# Patient Record
Sex: MALE | Race: WHITE | NOT HISPANIC OR LATINO | Employment: UNEMPLOYED | URBAN - METROPOLITAN AREA
[De-identification: names, ages, dates, MRNs, and addresses within clinical notes are randomized per-mention and may not be internally consistent; named-entity substitution may affect disease eponyms.]

---

## 2020-01-29 ENCOUNTER — APPOINTMENT (OUTPATIENT)
Dept: GENERAL RADIOLOGY | Facility: HOSPITAL | Age: 25
End: 2020-01-29

## 2020-01-29 ENCOUNTER — HOSPITAL ENCOUNTER (EMERGENCY)
Facility: HOSPITAL | Age: 25
Discharge: HOME | End: 2020-01-29
Admitting: EMERGENCY MEDICINE

## 2020-01-29 VITALS
TEMPERATURE: 97.9 F | OXYGEN SATURATION: 98 % | HEART RATE: 66 BPM | BODY MASS INDEX: 24.97 KG/M2 | HEIGHT: 71 IN | DIASTOLIC BLOOD PRESSURE: 77 MMHG | SYSTOLIC BLOOD PRESSURE: 129 MMHG | RESPIRATION RATE: 18 BRPM | WEIGHT: 178.35 LBS

## 2020-01-29 DIAGNOSIS — S62.356A CLOSED NONDISPLACED FRACTURE OF SHAFT OF FIFTH METACARPAL BONE OF RIGHT HAND, INITIAL ENCOUNTER: Primary | ICD-10-CM

## 2020-01-29 DIAGNOSIS — M79.641 RIGHT HAND PAIN: ICD-10-CM

## 2020-01-29 PROCEDURE — 73130 X-RAY EXAM OF HAND: CPT

## 2020-01-29 PROCEDURE — 99283 EMERGENCY DEPT VISIT LOW MDM: CPT

## 2020-01-29 RX ORDER — IBUPROFEN 600 MG/1
600 TABLET ORAL ONCE
Status: COMPLETED | OUTPATIENT
Start: 2020-01-29 | End: 2020-01-29

## 2020-01-29 RX ADMIN — IBUPROFEN 600 MG: 600 TABLET, FILM COATED ORAL at 14:56

## 2020-02-01 ENCOUNTER — HOSPITAL ENCOUNTER (OUTPATIENT)
Facility: HOSPITAL | Age: 25
Setting detail: HOSPITAL OUTPATIENT SURGERY
Discharge: HOME | End: 2020-02-01
Attending: ORTHOPAEDIC SURGERY | Admitting: ORTHOPAEDIC SURGERY

## 2020-02-01 ENCOUNTER — ANESTHESIA EVENT (OUTPATIENT)
Dept: PERIOP | Facility: HOSPITAL | Age: 25
End: 2020-02-01

## 2020-02-01 ENCOUNTER — HOSPITAL ENCOUNTER (OUTPATIENT)
Dept: GENERAL RADIOLOGY | Facility: HOSPITAL | Age: 25
Discharge: HOME | End: 2020-02-01

## 2020-02-01 ENCOUNTER — HOSPITAL ENCOUNTER (OUTPATIENT)
Dept: GENERAL RADIOLOGY | Facility: HOSPITAL | Age: 25
Discharge: HOME | End: 2020-02-01
Admitting: ORTHOPAEDIC SURGERY

## 2020-02-01 ENCOUNTER — ANESTHESIA (OUTPATIENT)
Dept: PERIOP | Facility: HOSPITAL | Age: 25
End: 2020-02-01

## 2020-02-01 VITALS
WEIGHT: 181.22 LBS | BODY MASS INDEX: 25.37 KG/M2 | HEART RATE: 68 BPM | OXYGEN SATURATION: 98 % | DIASTOLIC BLOOD PRESSURE: 68 MMHG | HEIGHT: 71 IN | TEMPERATURE: 97.3 F | SYSTOLIC BLOOD PRESSURE: 125 MMHG | RESPIRATION RATE: 16 BRPM

## 2020-02-01 DIAGNOSIS — T14.8XXA FRACTURE: ICD-10-CM

## 2020-02-01 DIAGNOSIS — S62.326A CLOSED DISPLACED FRACTURE OF SHAFT OF FIFTH METACARPAL BONE OF RIGHT HAND, INITIAL ENCOUNTER: Primary | ICD-10-CM

## 2020-02-01 PROCEDURE — 25010000002 ONDANSETRON PER 1 MG: Performed by: ANESTHESIOLOGY

## 2020-02-01 PROCEDURE — 73120 X-RAY EXAM OF HAND: CPT

## 2020-02-01 PROCEDURE — 25010000002 DEXAMETHASONE PER 1 MG: Performed by: ANESTHESIOLOGY

## 2020-02-01 PROCEDURE — 25010000002 FENTANYL CITRATE (PF) 100 MCG/2ML SOLUTION: Performed by: ANESTHESIOLOGY

## 2020-02-01 PROCEDURE — C1713 ANCHOR/SCREW BN/BN,TIS/BN: HCPCS | Performed by: ORTHOPAEDIC SURGERY

## 2020-02-01 PROCEDURE — 25010000002 MIDAZOLAM PER 1 MG: Performed by: ANESTHESIOLOGY

## 2020-02-01 PROCEDURE — 25010000002 PROPOFOL 10 MG/ML EMULSION: Performed by: ANESTHESIOLOGY

## 2020-02-01 PROCEDURE — 76000 FLUOROSCOPY <1 HR PHYS/QHP: CPT

## 2020-02-01 PROCEDURE — 25010000002 KETOROLAC TROMETHAMINE PER 15 MG: Performed by: ANESTHESIOLOGY

## 2020-02-01 DEVICE — IMPLANTABLE DEVICE
Type: IMPLANTABLE DEVICE | Site: FINGER LITTLE | Status: FUNCTIONAL
Brand: MICROAIRE®

## 2020-02-01 RX ORDER — ONDANSETRON 2 MG/ML
INJECTION INTRAMUSCULAR; INTRAVENOUS AS NEEDED
Status: DISCONTINUED | OUTPATIENT
Start: 2020-02-01 | End: 2020-02-01 | Stop reason: SURG

## 2020-02-01 RX ORDER — NALBUPHINE HCL 10 MG/ML
2 AMPUL (ML) INJECTION EVERY 4 HOURS PRN
Status: DISCONTINUED | OUTPATIENT
Start: 2020-02-01 | End: 2020-02-01 | Stop reason: HOSPADM

## 2020-02-01 RX ORDER — PROPOFOL 10 MG/ML
VIAL (ML) INTRAVENOUS AS NEEDED
Status: DISCONTINUED | OUTPATIENT
Start: 2020-02-01 | End: 2020-02-01 | Stop reason: SURG

## 2020-02-01 RX ORDER — BUPIVACAINE HYDROCHLORIDE 2.5 MG/ML
INJECTION, SOLUTION INFILTRATION; PERINEURAL AS NEEDED
Status: DISCONTINUED | OUTPATIENT
Start: 2020-02-01 | End: 2020-02-01 | Stop reason: HOSPADM

## 2020-02-01 RX ORDER — FLUMAZENIL 0.1 MG/ML
0.2 INJECTION INTRAVENOUS AS NEEDED
Status: DISCONTINUED | OUTPATIENT
Start: 2020-02-01 | End: 2020-02-01 | Stop reason: HOSPADM

## 2020-02-01 RX ORDER — MIDAZOLAM HYDROCHLORIDE 1 MG/ML
1 INJECTION INTRAMUSCULAR; INTRAVENOUS
Status: DISCONTINUED | OUTPATIENT
Start: 2020-02-01 | End: 2020-02-01 | Stop reason: HOSPADM

## 2020-02-01 RX ORDER — SODIUM CHLORIDE 9 MG/ML
INJECTION, SOLUTION INTRAVENOUS CONTINUOUS PRN
Status: DISCONTINUED | OUTPATIENT
Start: 2020-02-01 | End: 2020-02-01 | Stop reason: SURG

## 2020-02-01 RX ORDER — HYDROMORPHONE HCL 110MG/55ML
0.5 PATIENT CONTROLLED ANALGESIA SYRINGE INTRAVENOUS
Status: DISCONTINUED | OUTPATIENT
Start: 2020-02-01 | End: 2020-02-01 | Stop reason: HOSPADM

## 2020-02-01 RX ORDER — MEPERIDINE HYDROCHLORIDE 25 MG/ML
12.5 INJECTION INTRAMUSCULAR; INTRAVENOUS; SUBCUTANEOUS
Status: DISCONTINUED | OUTPATIENT
Start: 2020-02-01 | End: 2020-02-01 | Stop reason: HOSPADM

## 2020-02-01 RX ORDER — LABETALOL HYDROCHLORIDE 5 MG/ML
5 INJECTION, SOLUTION INTRAVENOUS
Status: DISCONTINUED | OUTPATIENT
Start: 2020-02-01 | End: 2020-02-01 | Stop reason: HOSPADM

## 2020-02-01 RX ORDER — SODIUM CHLORIDE 0.9 % (FLUSH) 0.9 %
10 SYRINGE (ML) INJECTION AS NEEDED
Status: DISCONTINUED | OUTPATIENT
Start: 2020-02-01 | End: 2020-02-01 | Stop reason: HOSPADM

## 2020-02-01 RX ORDER — ALBUTEROL SULFATE 2.5 MG/3ML
2.5 SOLUTION RESPIRATORY (INHALATION) ONCE AS NEEDED
Status: DISCONTINUED | OUTPATIENT
Start: 2020-02-01 | End: 2020-02-01 | Stop reason: HOSPADM

## 2020-02-01 RX ORDER — HYDROCODONE BITARTRATE AND ACETAMINOPHEN 7.5; 325 MG/1; MG/1
1-2 TABLET ORAL EVERY 4 HOURS PRN
Qty: 20 TABLET | Refills: 0 | Status: SHIPPED | OUTPATIENT
Start: 2020-02-01 | End: 2021-07-02

## 2020-02-01 RX ORDER — KETOROLAC TROMETHAMINE 30 MG/ML
INJECTION, SOLUTION INTRAMUSCULAR; INTRAVENOUS AS NEEDED
Status: DISCONTINUED | OUTPATIENT
Start: 2020-02-01 | End: 2020-02-01 | Stop reason: SURG

## 2020-02-01 RX ORDER — DIPHENHYDRAMINE HYDROCHLORIDE 50 MG/ML
12.5 INJECTION INTRAMUSCULAR; INTRAVENOUS
Status: DISCONTINUED | OUTPATIENT
Start: 2020-02-01 | End: 2020-02-01 | Stop reason: HOSPADM

## 2020-02-01 RX ORDER — DEXAMETHASONE SODIUM PHOSPHATE 4 MG/ML
INJECTION, SOLUTION INTRA-ARTICULAR; INTRALESIONAL; INTRAMUSCULAR; INTRAVENOUS; SOFT TISSUE AS NEEDED
Status: DISCONTINUED | OUTPATIENT
Start: 2020-02-01 | End: 2020-02-01 | Stop reason: SURG

## 2020-02-01 RX ORDER — MIDAZOLAM HYDROCHLORIDE 1 MG/ML
INJECTION INTRAMUSCULAR; INTRAVENOUS AS NEEDED
Status: DISCONTINUED | OUTPATIENT
Start: 2020-02-01 | End: 2020-02-01 | Stop reason: SURG

## 2020-02-01 RX ORDER — SODIUM CHLORIDE, SODIUM LACTATE, POTASSIUM CHLORIDE, CALCIUM CHLORIDE 600; 310; 30; 20 MG/100ML; MG/100ML; MG/100ML; MG/100ML
20 INJECTION, SOLUTION INTRAVENOUS ONCE
Status: COMPLETED | OUTPATIENT
Start: 2020-02-01 | End: 2020-02-01

## 2020-02-01 RX ORDER — EPHEDRINE SULFATE 50 MG/ML
5 INJECTION, SOLUTION INTRAVENOUS ONCE AS NEEDED
Status: DISCONTINUED | OUTPATIENT
Start: 2020-02-01 | End: 2020-02-01 | Stop reason: HOSPADM

## 2020-02-01 RX ORDER — PROMETHAZINE HYDROCHLORIDE 25 MG/1
25 TABLET ORAL ONCE AS NEEDED
Status: DISCONTINUED | OUTPATIENT
Start: 2020-02-01 | End: 2020-02-01 | Stop reason: HOSPADM

## 2020-02-01 RX ORDER — LORAZEPAM 2 MG/ML
1 INJECTION INTRAMUSCULAR
Status: DISCONTINUED | OUTPATIENT
Start: 2020-02-01 | End: 2020-02-01 | Stop reason: HOSPADM

## 2020-02-01 RX ORDER — ONDANSETRON 2 MG/ML
4 INJECTION INTRAMUSCULAR; INTRAVENOUS ONCE AS NEEDED
Status: DISCONTINUED | OUTPATIENT
Start: 2020-02-01 | End: 2020-02-01 | Stop reason: HOSPADM

## 2020-02-01 RX ORDER — NALOXONE HCL 0.4 MG/ML
0.4 VIAL (ML) INJECTION AS NEEDED
Status: DISCONTINUED | OUTPATIENT
Start: 2020-02-01 | End: 2020-02-01 | Stop reason: HOSPADM

## 2020-02-01 RX ORDER — HYDRALAZINE HYDROCHLORIDE 20 MG/ML
5 INJECTION INTRAMUSCULAR; INTRAVENOUS
Status: DISCONTINUED | OUTPATIENT
Start: 2020-02-01 | End: 2020-02-01 | Stop reason: HOSPADM

## 2020-02-01 RX ORDER — PROMETHAZINE HYDROCHLORIDE 25 MG/1
25 SUPPOSITORY RECTAL ONCE AS NEEDED
Status: DISCONTINUED | OUTPATIENT
Start: 2020-02-01 | End: 2020-02-01 | Stop reason: HOSPADM

## 2020-02-01 RX ORDER — PHENYLEPHRINE HCL IN 0.9% NACL 0.5 MG/5ML
.5-3 SYRINGE (ML) INTRAVENOUS
Status: DISCONTINUED | OUTPATIENT
Start: 2020-02-01 | End: 2020-02-01 | Stop reason: HOSPADM

## 2020-02-01 RX ORDER — PROMETHAZINE HYDROCHLORIDE 25 MG/ML
6.25 INJECTION, SOLUTION INTRAMUSCULAR; INTRAVENOUS ONCE AS NEEDED
Status: DISCONTINUED | OUTPATIENT
Start: 2020-02-01 | End: 2020-02-01 | Stop reason: HOSPADM

## 2020-02-01 RX ORDER — HYDROCODONE BITARTRATE AND ACETAMINOPHEN 7.5; 325 MG/1; MG/1
1 TABLET ORAL EVERY 6 HOURS PRN
COMMUNITY
Start: 2020-01-31 | End: 2021-07-02

## 2020-02-01 RX ORDER — FENTANYL CITRATE 50 UG/ML
INJECTION, SOLUTION INTRAMUSCULAR; INTRAVENOUS AS NEEDED
Status: DISCONTINUED | OUTPATIENT
Start: 2020-02-01 | End: 2020-02-01 | Stop reason: SURG

## 2020-02-01 RX ORDER — NALBUPHINE HCL 10 MG/ML
10 AMPUL (ML) INJECTION EVERY 4 HOURS PRN
Status: DISCONTINUED | OUTPATIENT
Start: 2020-02-01 | End: 2020-02-01 | Stop reason: HOSPADM

## 2020-02-01 RX ORDER — ATROPINE SULFATE 1 MG/ML
0.5 INJECTION, SOLUTION INTRAMUSCULAR; INTRAVENOUS; SUBCUTANEOUS ONCE AS NEEDED
Status: DISCONTINUED | OUTPATIENT
Start: 2020-02-01 | End: 2020-02-01 | Stop reason: HOSPADM

## 2020-02-01 RX ORDER — LIDOCAINE HYDROCHLORIDE 10 MG/ML
0.5 INJECTION, SOLUTION INFILTRATION; PERINEURAL ONCE AS NEEDED
Status: DISCONTINUED | OUTPATIENT
Start: 2020-02-01 | End: 2020-02-01 | Stop reason: HOSPADM

## 2020-02-01 RX ORDER — LIDOCAINE HYDROCHLORIDE 20 MG/ML
INJECTION, SOLUTION EPIDURAL; INFILTRATION; INTRACAUDAL; PERINEURAL AS NEEDED
Status: DISCONTINUED | OUTPATIENT
Start: 2020-02-01 | End: 2020-02-01 | Stop reason: SURG

## 2020-02-01 RX ADMIN — KETOROLAC TROMETHAMINE 30 MG: 30 INJECTION, SOLUTION INTRAMUSCULAR at 09:46

## 2020-02-01 RX ADMIN — MIDAZOLAM 2 MG: 1 INJECTION INTRAMUSCULAR; INTRAVENOUS at 09:46

## 2020-02-01 RX ADMIN — LIDOCAINE HYDROCHLORIDE 50 MG: 20 INJECTION, SOLUTION EPIDURAL; INFILTRATION; INTRACAUDAL; PERINEURAL at 09:46

## 2020-02-01 RX ADMIN — PROPOFOL 200 MG: 10 INJECTION, EMULSION INTRAVENOUS at 09:46

## 2020-02-01 RX ADMIN — FENTANYL CITRATE 100 MCG: 50 INJECTION, SOLUTION INTRAMUSCULAR; INTRAVENOUS at 09:46

## 2020-02-01 RX ADMIN — DEXAMETHASONE SODIUM PHOSPHATE 4 MG: 4 INJECTION, SOLUTION INTRAMUSCULAR; INTRAVENOUS at 09:46

## 2020-02-01 RX ADMIN — SODIUM CHLORIDE: 0.9 INJECTION, SOLUTION INTRAVENOUS at 08:35

## 2020-02-01 RX ADMIN — ONDANSETRON 4 MG: 2 INJECTION INTRAMUSCULAR; INTRAVENOUS at 09:46

## 2020-02-01 RX ADMIN — SODIUM CHLORIDE, SODIUM LACTATE, POTASSIUM CHLORIDE, AND CALCIUM CHLORIDE 20 ML/HR: .6; .31; .03; .02 INJECTION, SOLUTION INTRAVENOUS at 08:26

## 2020-02-01 RX ADMIN — CEFAZOLIN SODIUM 2 G: 1 INJECTION, POWDER, FOR SOLUTION INTRAMUSCULAR; INTRAVENOUS at 09:46

## 2020-02-01 NOTE — OP NOTE
FINGER/THUMB OPEN REDUCTION INTERNAL FIXATION  Procedure Report    Patient Name:  Herb Smalls  YOB: 1995    Date of Surgery:  2/1/2020         Pre-op Diagnosis: Right fifth metacarpal shaft fracture    Postop diagnosis: Same    Indication: 24-year-old white male who punched a wall several days ago sustaining fifth metacarpal shaft fracture.  About 23 degrees angulation is felt that should be pinned to prevent a malunion      Procedure/CPT® Codes:      Procedure(s):  PERCUTANEOUS PINNING RT 5TH METACARPAL WITH C-ARM    Staff:  Surgeon(s):  Chadwick Tapia MD         Anesthesia: General    Estimated Blood Loss: 0    Implants:    Implant Name Type Inv. Item Serial No.  Lot No. LRB No. Used   KWIRE SMOTH DBL TROC .045 9IN - [id number] Implant KWIRE SMOTH DBL TROC .045 9IN  [employer] [id number] Right 1       Specimen:          0        Findings: 23 degrees apex dorsal angulated fifth metacarpal shaft fracture    Complications: 0    Description of Procedure: Patient is seen the holding room.  Identified the right fifth finger is a correct one.  This initialed by me.  Seen in the OR where timeout form had 2 g Kefzol IV preoperatively was positioned spine with his arm on a hand table.  Sterile prep and drape in his right upper extremity.  A mini C-arm was used to reduce the fracture and a 0.045 K wire was then started at the ulnar aspect of the fifth metacarpal head staying just off the articular surface pin was advanced across the fracture into the base of the fifth metacarpal with good alignment.  AP lateral and oblique views were saved.  The wire was cut off by the skin bent and the pin Placed on it sterile dressing was applied patient was placed in a ulnar gutter splint with the IP joints in full extension of the fourth and fifth digits and the MP joints about 45 degrees flexion.  Plan is for patient to return in 3 weeks to have pin pulled in the office      Chadwick Tapia MD     Date:  2/1/2020  Time: 10:27 AM

## 2020-02-01 NOTE — ANESTHESIA PROCEDURE NOTES
Airway  Urgency: elective    Date/Time: 2/1/2020 9:45 AM    General Information and Staff    Patient location during procedure: OR    Indications and Patient Condition  Indications for airway management: airway protection    Preoxygenated: yes  Mask difficulty assessment: 1 - vent by mask    Final Airway Details  Final airway type: supraglottic airway      Successful airway: LMA  Size 5    Number of attempts at approach: 1  Assessment: lips, teeth, and gum same as pre-op and atraumatic intubation

## 2020-02-01 NOTE — ANESTHESIA PREPROCEDURE EVALUATION
Anesthesia Evaluation     Patient summary reviewed and Nursing notes reviewed   NPO Solid Status: > 8 hours  NPO Liquid Status: > 8 hours           Airway   Mallampati: II  TM distance: >3 FB  Neck ROM: full  No difficulty expected  Dental - normal exam     Pulmonary - negative pulmonary ROS and normal exam    breath sounds clear to auscultation  Cardiovascular - negative cardio ROS and normal exam  Exercise tolerance: unable to assess    Rhythm: regular  Rate: normal        Neuro/Psych- negative ROS  GI/Hepatic/Renal/Endo - negative ROS     Musculoskeletal (-) negative ROS    Abdominal  - normal exam   Substance History - negative use     OB/GYN negative ob/gyn ROS         Other - negative ROS       ROS/Med Hx Other: ASD 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 No problems since                   Anesthesia Plan    ASA 2     general     intravenous induction     Anesthetic plan, all risks, benefits, and alternatives have been provided, discussed and informed consent has been obtained with: patient.

## 2020-02-01 NOTE — ANESTHESIA POSTPROCEDURE EVALUATION
Patient: Herb Smalls    Procedure Summary     Date:  02/01/20 Room / Location:  [facility] OR [address] [place] [address] MAIN OR    Anesthesia Start:  0944 Anesthesia Stop:  1027    Procedure:  PERCUTANEOUS PINNING RT 5TH METACARPAL WITH C-ARM (Right Hand) Diagnosis:       Displaced fracture of shaft of fifth metacarpal bone of right hand      (Displaced fracture of shaft of fifth metacarpal bone of right hand [S62.326A])    Surgeon:  Chadwick Tapia MD Provider:  Johnathon Wyman MD    Anesthesia Type:  general ASA Status:  2          Anesthesia Type: general    Vitals  Vitals Value Taken Time   /70 2/1/2020 11:26 AM   Temp 97.3 °F (36.3 °C) 2/1/2020 11:20 AM   Pulse 62 2/1/2020 11:27 AM   Resp 15 2/1/2020 11:10 AM   SpO2 98 % 2/1/2020 11:27 AM   Vitals shown include unvalidated device data.        Post Anesthesia Care and Evaluation    Patient location during evaluation: PACU  Patient participation: complete - patient participated  Level of consciousness: awake  Pain scale: See nurse's notes for pain score.  Pain management: adequate  Airway patency: patent  Anesthetic complications: No anesthetic complications  PONV Status: none  Cardiovascular status: acceptable  Respiratory status: acceptable  Hydration status: acceptable    Comments: Patient seen and examined postoperatively; vital signs stable; SpO2 greater than or equal to 90%; cardiopulmonary status stable; nausea/vomiting adequately controlled; pain adequately controlled; no apparent anesthesia complications; patient discharged from anesthesia care when discharge criteria were met

## 2020-02-01 NOTE — DISCHARGE INSTRUCTIONS
Keep clean and dry  Do not remove splint  2 pound lifting right hand  May return to work if able to abide by the above restrictions

## 2020-05-06 ENCOUNTER — APPOINTMENT (OUTPATIENT)
Dept: GENERAL RADIOLOGY | Facility: HOSPITAL | Age: 25
End: 2020-05-06

## 2020-05-06 ENCOUNTER — HOSPITAL ENCOUNTER (EMERGENCY)
Facility: HOSPITAL | Age: 25
Discharge: HOME | End: 2020-05-06
Admitting: EMERGENCY MEDICINE

## 2020-05-06 VITALS
OXYGEN SATURATION: 98 % | SYSTOLIC BLOOD PRESSURE: 133 MMHG | TEMPERATURE: 98.1 F | RESPIRATION RATE: 18 BRPM | DIASTOLIC BLOOD PRESSURE: 82 MMHG | HEART RATE: 81 BPM | BODY MASS INDEX: 26.65 KG/M2 | HEIGHT: 71 IN | WEIGHT: 190.4 LBS

## 2020-05-06 DIAGNOSIS — R07.89 CHEST WALL PAIN: ICD-10-CM

## 2020-05-06 DIAGNOSIS — I45.4 IVCD (INTRAVENTRICULAR CONDUCTION DEFECT): Primary | ICD-10-CM

## 2020-05-06 LAB
ALBUMIN SERPL-MCNC: 4.8 G/DL (ref 3.5–5.2)
ALBUMIN/GLOB SERPL: 1.6 G/DL
ALP SERPL-CCNC: 88 U/L (ref 39–117)
ALT SERPL W P-5'-P-CCNC: 21 U/L (ref 1–41)
ANION GAP SERPL CALCULATED.3IONS-SCNC: 12 MMOL/L (ref 5–15)
AST SERPL-CCNC: 23 U/L (ref 1–40)
BASOPHILS # BLD AUTO: 0.1 10*3/MM3 (ref 0–0.2)
BASOPHILS NFR BLD AUTO: 0.4 % (ref 0–1.5)
BILIRUB SERPL-MCNC: 0.4 MG/DL (ref 0.2–1.2)
BUN BLD-MCNC: 12 MG/DL (ref 6–20)
BUN/CREAT SERPL: 13.8 (ref 7–25)
CALCIUM SPEC-SCNC: 9.9 MG/DL (ref 8.6–10.5)
CHLORIDE SERPL-SCNC: 102 MMOL/L (ref 98–107)
CO2 SERPL-SCNC: 28 MMOL/L (ref 22–29)
CREAT BLD-MCNC: 0.87 MG/DL (ref 0.76–1.27)
D DIMER PPP FEU-MCNC: 0.33 MCGFEU/ML (ref 0.17–0.59)
DEPRECATED RDW RBC AUTO: 43.3 FL (ref 37–54)
EOSINOPHIL # BLD AUTO: 0.3 10*3/MM3 (ref 0–0.4)
EOSINOPHIL NFR BLD AUTO: 1.8 % (ref 0.3–6.2)
ERYTHROCYTE [DISTWIDTH] IN BLOOD BY AUTOMATED COUNT: 14.2 % (ref 12.3–15.4)
GFR SERPL CREATININE-BSD FRML MDRD: 110 ML/MIN/1.73
GLOBULIN UR ELPH-MCNC: 3 GM/DL
GLUCOSE BLD-MCNC: 116 MG/DL (ref 65–99)
HCT VFR BLD AUTO: 42.5 % (ref 37.5–51)
HGB BLD-MCNC: 15.1 G/DL (ref 13–17.7)
LYMPHOCYTES # BLD AUTO: 2.6 10*3/MM3 (ref 0.7–3.1)
LYMPHOCYTES NFR BLD AUTO: 16.8 % (ref 19.6–45.3)
MCH RBC QN AUTO: 31 PG (ref 26.6–33)
MCHC RBC AUTO-ENTMCNC: 35.5 G/DL (ref 31.5–35.7)
MCV RBC AUTO: 87.2 FL (ref 79–97)
MONOCYTES # BLD AUTO: 0.8 10*3/MM3 (ref 0.1–0.9)
MONOCYTES NFR BLD AUTO: 5.3 % (ref 5–12)
NEUTROPHILS # BLD AUTO: 11.8 10*3/MM3 (ref 1.7–7)
NEUTROPHILS NFR BLD AUTO: 75.7 % (ref 42.7–76)
NRBC BLD AUTO-RTO: 0.1 /100 WBC (ref 0–0.2)
PLATELET # BLD AUTO: 230 10*3/MM3 (ref 140–450)
PMV BLD AUTO: 8.2 FL (ref 6–12)
POTASSIUM BLD-SCNC: 4.3 MMOL/L (ref 3.5–5.2)
PROT SERPL-MCNC: 7.8 G/DL (ref 6–8.5)
RBC # BLD AUTO: 4.87 10*6/MM3 (ref 4.14–5.8)
SODIUM BLD-SCNC: 142 MMOL/L (ref 136–145)
TROPONIN T SERPL-MCNC: <0.01 NG/ML (ref 0–0.03)
TROPONIN T SERPL-MCNC: <0.01 NG/ML (ref 0–0.03)
WBC NRBC COR # BLD: 15.6 10*3/MM3 (ref 3.4–10.8)

## 2020-05-06 PROCEDURE — 80053 COMPREHEN METABOLIC PANEL: CPT | Performed by: PHYSICIAN ASSISTANT

## 2020-05-06 PROCEDURE — 84484 ASSAY OF TROPONIN QUANT: CPT | Performed by: PHYSICIAN ASSISTANT

## 2020-05-06 PROCEDURE — 71046 X-RAY EXAM CHEST 2 VIEWS: CPT

## 2020-05-06 PROCEDURE — 85025 COMPLETE CBC W/AUTO DIFF WBC: CPT | Performed by: PHYSICIAN ASSISTANT

## 2020-05-06 PROCEDURE — 93005 ELECTROCARDIOGRAM TRACING: CPT

## 2020-05-06 PROCEDURE — 96374 THER/PROPH/DIAG INJ IV PUSH: CPT

## 2020-05-06 PROCEDURE — 93005 ELECTROCARDIOGRAM TRACING: CPT | Performed by: PHYSICIAN ASSISTANT

## 2020-05-06 PROCEDURE — 99284 EMERGENCY DEPT VISIT MOD MDM: CPT

## 2020-05-06 PROCEDURE — 85379 FIBRIN DEGRADATION QUANT: CPT | Performed by: PHYSICIAN ASSISTANT

## 2020-05-06 PROCEDURE — 25010000002 KETOROLAC TROMETHAMINE PER 15 MG: Performed by: PHYSICIAN ASSISTANT

## 2020-05-06 RX ORDER — SODIUM CHLORIDE 0.9 % (FLUSH) 0.9 %
10 SYRINGE (ML) INJECTION AS NEEDED
Status: DISCONTINUED | OUTPATIENT
Start: 2020-05-06 | End: 2020-05-07 | Stop reason: HOSPADM

## 2020-05-06 RX ORDER — KETOROLAC TROMETHAMINE 15 MG/ML
15 INJECTION, SOLUTION INTRAMUSCULAR; INTRAVENOUS ONCE
Status: COMPLETED | OUTPATIENT
Start: 2020-05-06 | End: 2020-05-06

## 2020-05-06 RX ORDER — NAPROXEN 500 MG/1
500 TABLET ORAL 2 TIMES DAILY WITH MEALS
Qty: 20 TABLET | Refills: 0 | Status: SHIPPED | OUTPATIENT
Start: 2020-05-06 | End: 2021-07-02

## 2020-05-06 RX ADMIN — KETOROLAC TROMETHAMINE 15 MG: 15 INJECTION, SOLUTION INTRAMUSCULAR; INTRAVENOUS at 20:38

## 2020-05-06 RX ADMIN — SODIUM CHLORIDE 500 ML: 0.9 INJECTION, SOLUTION INTRAVENOUS at 20:38

## 2020-05-07 NOTE — ED NOTES
"During assessment,pt exhibits pain in right rib area when he takes a deep breath.Area nontender to palpation, but he states that the area feels \"sore from the inside.\"     Sultana Harden RN  05/06/20 2044    "

## 2020-05-07 NOTE — ED NOTES
Throughout pt visit, vital signs have been difficult to obtain due to pt thrashing about bed, not complying with allowing me to perform bp or other vital signs manually. Attempted to have other staff assist in this task, but pt continues to refuse     Sultana Harden, JOANNE  05/06/20 6571       Sultana Harden RN  05/06/20 2453

## 2020-05-07 NOTE — ED PROVIDER NOTES
"Subjective   History of Present Illness  Patient is a 24-year-old male who presents with complaints of right-sided chest pain since around 4 PM today patient denies any known injury to the area.  Patient describes as a constant shooting type pain that he rates a 6/10 severity states the pain is worse with certain movements and deep breaths better with rest.  Patient states he is taken no medications for symptoms and denies any radiation of the pain.  Patient denies any associating symptoms including dyspnea, recent cough or illness, nausea or vomiting, diaphoresis or fever.  Patient denies any known sick contacts.  Patient does report history of heart murmur as a child due to \"hole in heart\" states it was repaired in 2004 in [place] he is on sure who repaired it.  States he has not had any cardiac evaluation since.  Patient is a current every day smoker.  He denies any recent travel, surgeries, immobilizations.  Review of Systems   Constitutional: Negative.    Respiratory: Negative.    Cardiovascular: Positive for chest pain. Negative for palpitations and leg swelling.   Gastrointestinal: Negative.    Skin: Negative.    Neurological: Negative.        Past Medical History:   Diagnosis Date   • ADHD (attention deficit hyperactivity disorder)    • History of heart murmur in childhood     due to \"hole in heart size of a quarter\"       No Known Allergies    Past Surgical History:   Procedure Laterality Date   • CARDIAC SURGERY  2004    Surgery to repair hole in heart with a patch.   • ORIF FINGER FRACTURE Right 2/1/2020    Procedure: PERCUTANEOUS PINNING RT 5TH METACARPAL WITH C-ARM;  Surgeon: Chadwick Tapia MD;  Location: [facility];  Service: Orthopedics       No family history on file.    Social History     Socioeconomic History   • Marital status: Single     Spouse name: Not on file   • Number of children: Not on file   • Years of education: Not on file   • Highest education level: Not on file   Tobacco Use " "  • Smoking status: Current Every Day Smoker     Packs/day: 0.25     Types: Cigarettes     Start date: 2/1/2014   • Smokeless tobacco: Former User   Substance and Sexual Activity   • Alcohol use: Not Currently   • Drug use: Never   • Sexual activity: Defer           Objective   Physical Exam   Constitutional: He is oriented to person, place, and time. He appears well-developed and well-nourished.  Non-toxic appearance. He does not appear ill. No distress.   HENT:   Head: Normocephalic and atraumatic.   Mouth/Throat: No oropharyngeal exudate.   Eyes: Pupils are equal, round, and reactive to light. EOM are normal. No scleral icterus.   Neck: Normal range of motion.   Cardiovascular: Normal rate, regular rhythm and intact distal pulses. Exam reveals no gallop and no friction rub.   No murmur heard.  Pulmonary/Chest: Effort normal and breath sounds normal. No accessory muscle usage or stridor. No tachypnea. No respiratory distress. He has no decreased breath sounds. He has no wheezes. He has no rhonchi. He has no rales. He exhibits no tenderness, no crepitus, no edema and no swelling.   Musculoskeletal:        Right lower leg: He exhibits no edema.        Left lower leg: He exhibits no edema.   Neurological: He is alert and oriented to person, place, and time. No sensory deficit.   Skin: Skin is warm. Capillary refill takes less than 2 seconds. No rash noted. He is not diaphoretic. No erythema. No pallor. Nails show no clubbing.   Psychiatric: He has a normal mood and affect. His behavior is normal.   Nursing note and vitals reviewed.      Procedures           ED Course    /82   Pulse 81   Temp 98.1 °F (36.7 °C) (Oral)   Resp 18   Ht 180.3 cm (71\")   Wt 86.4 kg (190 lb 6.4 oz)   SpO2 98%   BMI 26.56 kg/m²   Medications   sodium chloride 0.9 % flush 10 mL (has no administration in time range)   sodium chloride 0.9 % bolus 500 mL (500 mL Intravenous New Bag 5/6/20 2038)   ketorolac (TORADOL) injection 15 mg " (15 mg Intravenous Given 5/6/20 2038)     Labs Reviewed   COMPREHENSIVE METABOLIC PANEL - Abnormal; Notable for the following components:       Result Value    Glucose 116 (*)     All other components within normal limits    Narrative:     GFR Normal >60  Chronic Kidney Disease <60  Kidney Failure <15     CBC WITH AUTO DIFFERENTIAL - Abnormal; Notable for the following components:    WBC 15.60 (*)     Lymphocyte % 16.8 (*)     Neutrophils, Absolute 11.80 (*)     All other components within normal limits   TROPONIN (IN-HOUSE) - Normal    Narrative:     Troponin T Reference Range:  <= 0.03 ng/mL-   Negative for AMI  >0.03 ng/mL-     Abnormal for myocardial necrosis.  Clinicians would have to utilize clinical acumen, EKG, Troponin and serial changes to determine if it is an Acute Myocardial Infarction or myocardial injury due to an underlying chronic condition.       Results may be falsely decreased if patient taking Biotin.     TROPONIN (IN-HOUSE) - Normal    Narrative:     Troponin T Reference Range:  <= 0.03 ng/mL-   Negative for AMI  >0.03 ng/mL-     Abnormal for myocardial necrosis.  Clinicians would have to utilize clinical acumen, EKG, Troponin and serial changes to determine if it is an Acute Myocardial Infarction or myocardial injury due to an underlying chronic condition.       Results may be falsely decreased if patient taking Biotin.     D-DIMER, QUANTITATIVE - Normal    Narrative:     Reference Range  --------------------------------------------------------------------     < 0.50   Negative Predictive Value  0.50-0.59   Indeterminate    >= 0.60   Probable VTE             A very low percentage of patients with DVT may yield D-Dimer results   below the cut-off of 0.50 MCGFEU/mL.  This is known to be more   prevalent in patients with distal DVT.             Results of this test should always be interpreted in conjunction with   the patient's medical history, clinical presentation and other   findings.   Clinical diagnosis should not be based on the result of   INNOVANCE D-Dimer alone.   CBC AND DIFFERENTIAL    Narrative:     The following orders were created for panel order CBC & Differential.  Procedure                               Abnormality         Status                     ---------                               -----------         ------                     CBC Auto Differential[[id number]]        Abnormal            Final result                 Please view results for these tests on the individual orders.     Xr Chest 2 View    Result Date: 5/6/2020   1. No acute cardiopulmonary disease.   Electronically Signed ByKevin Garcia On:5/6/2020 8:36 PM This report was finalized on [id number] by  Haile Garcia, .                                            MDM  Number of Diagnoses or Management Options  Chest wall pain:   IVCD (intraventricular conduction defect):   Diagnosis management comments: Chart Review:  Comorbidity: History of heart murmur  Differentials: Acute coronary syndrome, pneumothorax, pneumonia, rib fracture, PE      ;this list is not all inclusive and does not constitute the entirety of considered causes  ECG: Interpreted by myself and Dr. Mitchell shows sinus rhythm rate 78 with IVCD no previous to review  Labs: Troponin within normal limits d-dimer 0.33 CBC shows WBC 15.6 hemoglobin 15.1 hematocrit 42.5 platelets 230.  CMP shows glucose 116 otherwise unremarkable.  Imaging: Was interpreted by physician and reviewed by myself:  Xr Chest 2 View  Result Date: 5/6/2020   1. No acute cardiopulmonary disease.   Electronically Signed ByKevin Garcia On:5/6/2020 8:36 PM This report was finalized on [id number] by  Haile Garcia, .    Disposition/Treatment:  Appropriate PPE was worn during exam and throughout all encounters with the patient.  Patient was placed on monitor.  While in the ED patient was afebrile and appeared nontoxic in no respiratory distress speaking in full sentences.  Patient  was given Toradol for his pain upon reassessment patient is resting quietly states improvement of his pain.  Patient's chest pain was reproducible with deep breaths.  Chest x-ray showed no acute cardiopulmonary abnormality as described above.  EKG showed sinus rhythm with IVCD no previous EKG was available to review at this time.  D-dimer and troponin were within normal limits.  Patient did have a slight elevated white count of 15.6 however patient denies any fever cough or other source of infection.  CMP was unremarkable.  Patient's chest wall pain source is unknown at this time he will be advised to follow-up with cardiology for an echocardiogram due to his IVCD. Heart score 2.  Lab results and findings were discussed with the patient who voiced understanding of discharge instructions along with signs and symptoms requiring return to the ED.  Upon discharged patient was in stable condition with followup for a revaluation.        Amount and/or Complexity of Data Reviewed  Clinical lab tests: reviewed  Tests in the radiology section of CPT®: reviewed  Tests in the medicine section of CPT®: reviewed        Final diagnoses:   IVCD (intraventricular conduction defect)   Chest wall pain            Ricki Byrd PA  05/06/20 0391

## 2020-05-07 NOTE — DISCHARGE INSTRUCTIONS
Follow-up with your primary care provider in 3-5 days.  If you do not have a primary care provider call [phone] for help in finding one, or you may follow up with [facility] at [phone].    Follow-up with cardiology as listed below for further evaluation and management.  Return to the ED for any new or worsening symptoms.    Take naproxen as needed for your chest wall pain.

## 2020-09-16 ENCOUNTER — APPOINTMENT (OUTPATIENT)
Dept: GENERAL RADIOLOGY | Facility: HOSPITAL | Age: 25
End: 2020-09-16

## 2020-09-16 ENCOUNTER — HOSPITAL ENCOUNTER (EMERGENCY)
Facility: HOSPITAL | Age: 25
Discharge: HOME | End: 2020-09-17
Admitting: EMERGENCY MEDICINE

## 2020-09-16 DIAGNOSIS — S46.911A STRAIN OF RIGHT SHOULDER, INITIAL ENCOUNTER: ICD-10-CM

## 2020-09-16 DIAGNOSIS — M25.511 ACUTE PAIN OF RIGHT SHOULDER: Primary | ICD-10-CM

## 2020-09-16 PROCEDURE — 73030 X-RAY EXAM OF SHOULDER: CPT

## 2020-09-16 PROCEDURE — 25010000002 KETOROLAC TROMETHAMINE PER 15 MG: Performed by: NURSE PRACTITIONER

## 2020-09-16 PROCEDURE — 99283 EMERGENCY DEPT VISIT LOW MDM: CPT

## 2020-09-16 PROCEDURE — 96372 THER/PROPH/DIAG INJ SC/IM: CPT

## 2020-09-16 RX ORDER — KETOROLAC TROMETHAMINE 30 MG/ML
30 INJECTION, SOLUTION INTRAMUSCULAR; INTRAVENOUS ONCE
Status: COMPLETED | OUTPATIENT
Start: 2020-09-16 | End: 2020-09-16

## 2020-09-16 RX ADMIN — KETOROLAC TROMETHAMINE 30 MG: 30 INJECTION, SOLUTION INTRAMUSCULAR at 23:39

## 2020-09-17 VITALS
DIASTOLIC BLOOD PRESSURE: 57 MMHG | OXYGEN SATURATION: 98 % | HEIGHT: 71 IN | BODY MASS INDEX: 25.83 KG/M2 | TEMPERATURE: 98.1 F | WEIGHT: 184.53 LBS | RESPIRATION RATE: 16 BRPM | SYSTOLIC BLOOD PRESSURE: 110 MMHG | HEART RATE: 79 BPM

## 2020-09-17 RX ORDER — NAPROXEN 500 MG/1
500 TABLET ORAL 2 TIMES DAILY PRN
Qty: 14 TABLET | Refills: 0 | Status: SHIPPED | OUTPATIENT
Start: 2020-09-17 | End: 2021-07-02

## 2020-09-17 RX ORDER — CYCLOBENZAPRINE HCL 10 MG
10 TABLET ORAL 3 TIMES DAILY PRN
Qty: 10 TABLET | Refills: 0 | Status: SHIPPED | OUTPATIENT
Start: 2020-09-17 | End: 2021-07-02

## 2020-09-17 NOTE — ED PROVIDER NOTES
"Subjective   Patient is a 25-year-old male presents emergency department complaint right shoulder pain.  He reports he is had a longstanding history of right shoulder pain the past, he reports he feels pain whenever he is not moving his arm.  He denies any direct trauma or injury to the area tonight.  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  Denies any relieving factors.  No radiation.  No numbness or tingling.          Review of Systems   Constitutional: Negative for chills and fever.   Musculoskeletal:        Right shoulder pain   Skin: Negative for rash.   Neurological: Negative for numbness.       Past Medical History:   Diagnosis Date   • ADHD (attention deficit hyperactivity disorder)    • History of heart murmur in childhood     due to \"hole in heart size of a quarter\"       No Known Allergies    Past Surgical History:   Procedure Laterality Date   • CARDIAC SURGERY  2004    Surgery to repair hole in heart with a patch.   • ORIF FINGER FRACTURE Right 2/1/2020    Procedure: PERCUTANEOUS PINNING RT 5TH METACARPAL WITH C-ARM;  Surgeon: Chadwick Tapia MD;  Location: [facility];  Service: Orthopedics       History reviewed. No pertinent family history.    Social History     Socioeconomic History   • Marital status: Single     Spouse name: Not on file   • Number of children: Not on file   • Years of education: Not on file   • Highest education level: Not on file   Tobacco Use   • Smoking status: Current Every Day Smoker     Packs/day: 0.25     Types: Cigarettes     Start date: 2/1/2014   • Smokeless tobacco: Former User   Substance and Sexual Activity   • Alcohol use: Not Currently   • Drug use: Never   • Sexual activity: Defer           Objective   Physical Exam  Vitals signs and nursing note reviewed.   Constitutional:       General: He is not in acute distress.     Appearance: Normal appearance. He is not ill-appearing, " "toxic-appearing or diaphoretic.   HENT:      Head: Normocephalic and atraumatic.      Nose: Nose normal.      Mouth/Throat:      Mouth: Mucous membranes are moist.   Eyes:      Extraocular Movements: Extraocular movements intact.      Conjunctiva/sclera: Conjunctivae normal.      Pupils: Pupils are equal, round, and reactive to light.   Neck:      Musculoskeletal: Normal range of motion and neck supple.   Cardiovascular:      Rate and Rhythm: Normal rate and regular rhythm.   Pulmonary:      Effort: Pulmonary effort is normal.      Breath sounds: Normal breath sounds.   Abdominal:      General: Bowel sounds are normal.      Palpations: Abdomen is soft.   Musculoskeletal:         General: No swelling or deformity.      Right shoulder: He exhibits tenderness and pain. He exhibits normal range of motion, no bony tenderness, no swelling, no effusion, no crepitus, no deformity, no laceration, no spasm, normal pulse and normal strength.      Comments: Pain with palpation of the anterior right shoulder.  Pain with range of motion.  No bony point tenderness.  Brachial/ radial pulse 2+   Skin:     General: Skin is warm and dry.      Capillary Refill: Capillary refill takes less than 2 seconds.      Findings: No erythema or rash.   Neurological:      Mental Status: He is alert and oriented to person, place, and time.   Psychiatric:         Mood and Affect: Mood normal.         Behavior: Behavior normal.         Thought Content: Thought content normal.         Judgment: Judgment normal.         Procedures           ED Course  /57 (BP Location: Left arm, Patient Position: Sitting)   Pulse 79   Temp 98.1 °F (36.7 °C) (Oral)   Resp 16   Ht 180.3 cm (71\")   Wt 83.7 kg (184 lb 8.4 oz)   SpO2 98%   BMI 25.74 kg/m²   Labs Reviewed - No data to display  Medications   ketorolac (TORADOL) injection 30 mg (30 mg Intramuscular Given 9/16/20 7292)     No radiology results for the last day        Appropriate PPE was worn during " the duration of the care for this patient while in the emergency department per [facility] guidelines                                     MDM  Number of Diagnoses or Management Options  Acute pain of right shoulder:   Strain of right shoulder, initial encounter:   Diagnosis management comments: Patient is a 25-year-old male who presents emergency department complaint right shoulder pain.  He reports he is had problems with his shoulder in the past, and is been worse the past 5 days.  No acute injury or trauma to the area.  No acute findings on x-ray today.  He will be placed in a sling, advised him to follow-up with his primary care provider for further evaluation.  He will be prescribed NSAIDs and muscle relaxants.    I spoke with the patient at the bedside regarding their plan of care, discharge instruction, home care, prescriptions, and importance follow-up.  We discussed test results at the bedside.  Patient was made aware of indications to return to the emergency department.  Patient agrees with the current plan of care for discharge, verbalized understanding of all instructions    Pt is aware that discharge does not mean that nothing is wrong but it indicates no emergency is present and they must continue care with follow-up as given below or physician of their choice       Amount and/or Complexity of Data Reviewed  Tests in the radiology section of CPT®: reviewed  Independent visualization of images, tracings, or specimens: yes        Final diagnoses:   Acute pain of right shoulder   Strain of right shoulder, initial encounter            Amy Timmons, APRN  09/17/20 0225

## 2020-09-17 NOTE — DISCHARGE INSTRUCTIONS
Please follow-up with your primary care provider, or orthopedics for further evaluation  Return to the ED for new or worsening symptoms  May wear sling for support

## 2020-11-03 ENCOUNTER — HOSPITAL ENCOUNTER (EMERGENCY)
Facility: HOSPITAL | Age: 25
Discharge: HOME | End: 2020-11-03
Admitting: EMERGENCY MEDICINE

## 2020-11-03 ENCOUNTER — APPOINTMENT (OUTPATIENT)
Dept: GENERAL RADIOLOGY | Facility: HOSPITAL | Age: 25
End: 2020-11-03

## 2020-11-03 VITALS
WEIGHT: 191.8 LBS | OXYGEN SATURATION: 99 % | SYSTOLIC BLOOD PRESSURE: 142 MMHG | HEART RATE: 82 BPM | BODY MASS INDEX: 26.85 KG/M2 | RESPIRATION RATE: 18 BRPM | DIASTOLIC BLOOD PRESSURE: 80 MMHG | HEIGHT: 71 IN | TEMPERATURE: 97.9 F

## 2020-11-03 DIAGNOSIS — M54.50 LUMBAR PAIN: Primary | ICD-10-CM

## 2020-11-03 PROCEDURE — 99283 EMERGENCY DEPT VISIT LOW MDM: CPT

## 2020-11-03 PROCEDURE — 72100 X-RAY EXAM L-S SPINE 2/3 VWS: CPT

## 2020-11-03 RX ORDER — LIDOCAINE 50 MG/G
1 PATCH TOPICAL EVERY 24 HOURS
Qty: 5 PATCH | Refills: 0 | Status: SHIPPED | OUTPATIENT
Start: 2020-11-03 | End: 2021-07-02

## 2020-11-03 RX ORDER — METHYLPREDNISOLONE 4 MG/1
TABLET ORAL
Qty: 21 TABLET | Refills: 0 | Status: SHIPPED | OUTPATIENT
Start: 2020-11-03 | End: 2021-07-02

## 2020-11-03 RX ORDER — LIDOCAINE 50 MG/G
1 PATCH TOPICAL ONCE
Status: DISCONTINUED | OUTPATIENT
Start: 2020-11-03 | End: 2020-11-03 | Stop reason: HOSPADM

## 2020-11-03 RX ORDER — DEXAMETHASONE SODIUM PHOSPHATE 10 MG/ML
6 INJECTION, SOLUTION INTRAMUSCULAR; INTRAVENOUS ONCE
Status: DISCONTINUED | OUTPATIENT
Start: 2020-11-03 | End: 2020-11-03 | Stop reason: HOSPADM

## 2020-11-03 RX ORDER — HYDROCODONE BITARTRATE AND ACETAMINOPHEN 7.5; 325 MG/1; MG/1
1 TABLET ORAL ONCE
Status: COMPLETED | OUTPATIENT
Start: 2020-11-03 | End: 2020-11-03

## 2020-11-03 RX ADMIN — LIDOCAINE 1 PATCH: 50 PATCH TOPICAL at 01:10

## 2020-11-03 RX ADMIN — HYDROCODONE BITARTRATE AND ACETAMINOPHEN 1 TABLET: 7.5; 325 TABLET ORAL at 01:10

## 2020-11-03 NOTE — DISCHARGE INSTRUCTIONS
Take steroid pack as directed. Use lidoderm as prescribed. It's important to establish a primary care provider for your primary care needs.  If symptoms persist, follow-up with spine.  Return to the ER for any new or worsening symptoms.

## 2020-11-03 NOTE — ED PROVIDER NOTES
"Subjective   25-year-old male presents with complaint of lower back pain described as \"sharp\" he denies any injury nor repetitive motion.  Reports that he was seen 2 months ago for right shoulder pain and \"it has moved down\".  He reports that the pain started this morning.  It is alleviated by sitting upright in a chair.  Denies worsening symptoms.  He denies urinary bowel incontinence nor saddle paresthesias.  He denies urinary symptoms melena hematochezia.  Denies any fever sweats or chills.  Reports he does have a history of scoliosis.  He does not have a primary care physician has not been seen by spine.    1. Location: Lumbar  2. Quality: Sharp  3. Severity: Moderate  4. Worsening factors: Denies  5. Alleviating factors: Sitting upright  6. Onset: This morning  7. Radiation: Denies  8. Frequency: Intermittent  9. Co-morbidities: Past Medical History:  No date: ADHD (attention deficit hyperactivity disorder)  No date: History of heart murmur in childhood      Comment:  due to \"hole in heart size of a quarter\"  10. Source: Patient            Review of Systems   Constitutional: Negative for chills, diaphoresis and fever.   Musculoskeletal: Positive for back pain. Negative for gait problem and neck pain.   Skin: Negative for color change, pallor and rash.   Neurological: Negative for dizziness, speech difficulty, weakness, numbness and headaches.   Psychiatric/Behavioral: Negative for agitation and confusion.   All other systems reviewed and are negative.      Past Medical History:   Diagnosis Date   • ADHD (attention deficit hyperactivity disorder)    • History of heart murmur in childhood     due to \"hole in heart size of a quarter\"       No Known Allergies    Past Surgical History:   Procedure Laterality Date   • CARDIAC SURGERY  2004    Surgery to repair hole in heart with a patch.   • ORIF FINGER FRACTURE Right 2/1/2020    Procedure: PERCUTANEOUS PINNING RT 5TH METACARPAL WITH C-ARM;  Surgeon: Chadwick Tapia, " MD;  [facility];  Service: Orthopedics       No family history on file.    Social History     Socioeconomic History   • Marital status: Single     Spouse name: Not on file   • Number of children: Not on file   • Years of education: Not on file   • Highest education level: Not on file   Tobacco Use   • Smoking status: Current Every Day Smoker     Packs/day: 0.25     Types: Cigarettes     Start date: 2/1/2014   • Smokeless tobacco: Former User   Substance and Sexual Activity   • Alcohol use: Not Currently   • Drug use: Never   • Sexual activity: Defer           Objective   Physical Exam  Vitals signs and nursing note reviewed.   Constitutional:       General: He is awake. He is not in acute distress.     Appearance: Normal appearance. He is well-developed and normal weight.   Neck:      Musculoskeletal: Full passive range of motion without pain, normal range of motion and neck supple. Normal range of motion. No erythema, neck rigidity or spinous process tenderness.      Trachea: Trachea and phonation normal.      Meningeal: Brudzinski's sign and Kernig's sign absent.   Cardiovascular:      Rate and Rhythm: Normal rate.      Pulses: Normal pulses.      Heart sounds: S1 normal and S2 normal.   Musculoskeletal: Normal range of motion.         General: Tenderness present. No swelling or deformity.      Lumbar back: He exhibits tenderness, bony tenderness and pain. He exhibits normal range of motion, no swelling, no edema, no deformity, no laceration, no spasm and normal pulse.        Back:       Comments: No bony step off noted nor erythema noted.  Distal pulses strong and equal bilaterally 2+.  Cap refill less than 2 seconds.  Sensation intact.   Skin:     General: Skin is warm and dry.      Capillary Refill: Capillary refill takes less than 2 seconds.   Neurological:      General: No focal deficit present.      Mental Status: He is alert and oriented to person, place, and time.      GCS: GCS eye subscore  "is 4. GCS verbal subscore is 5. GCS motor subscore is 6.      Cranial Nerves: Cranial nerves are intact. No cranial nerve deficit.      Sensory: Sensation is intact. No sensory deficit.      Motor: Motor function is intact. No weakness or abnormal muscle tone.      Gait: Gait normal.      Deep Tendon Reflexes: Reflexes normal.      Reflex Scores:       Patellar reflexes are 2+ on the right side and 2+ on the left side.  Psychiatric:         Mood and Affect: Mood normal.         Behavior: Behavior normal. Behavior is cooperative.         Thought Content: Thought content normal.         Judgment: Judgment normal.         Procedures           ED Course      No radiology results for the last day  Medications   lidocaine (LIDODERM) 5 % 1 patch (1 patch Transdermal Medication Applied 11/3/20 0110)   dexamethasone sodium phosphate injection 6 mg (6 mg Intramuscular Not Given 11/3/20 0113)   HYDROcodone-acetaminophen (NORCO) 7.5-325 MG per tablet 1 tablet (1 tablet Oral Given 11/3/20 0110)     Labs Reviewed - No data to display                                       MDM  Number of Diagnoses or Management Options  Lumbar pain:   Diagnosis management comments: Chart Review: 9/16/20 patient was seen for right shoulder pain.  Comorbidity: Past Medical History:  No date: ADHD (attention deficit hyperactivity disorder)  No date: History of heart murmur in childhood      Comment:  due to \"hole in heart size of a quarter\"  Imaging: Was interpreted by Dr. Nguyen and reviewed by myself: NAD.     Patient undressed and placed in gown for exam.  Appropriate PPE worn during patient exam. 25-year-old male presents with complaint of lower back pain described as \"sharp\" he denies any injury nor repetitive motion.  Reports that he was seen 2 months ago for right shoulder pain and \"it has moved down\".  He reports that the pain started this morning.  It is alleviated by sitting upright in a chair.  Denies worsening symptoms.  He denies urinary " bowel incontinence nor saddle paresthesias.  He denies urinary symptoms melena hematochezia.  Denies any fever sweats or chills.  Reports he does have a history of scoliosis.  He does not have a primary care physician has not been seen by spine.  No bony step-off nor erythema noted. Distal pulses strong and equal bilaterally 2+.  Cap refill less than 2 seconds.  Sensation intact.  Normal DTRs.  Xray obtained of the lumbar spine with the above findings. He was given lidoderm patch, Norco 7.5/325mg PO x1, and refused Decadron 6mg IM.  Patient was discharged home with prescription for Medrol Dosepak and Lidoderm patches.  He is given follow-up with Dr. August, primary care on-call as he does not primary care established.  He is also given follow-up with spine if his symptoms persist.    Disposition/Treatment: Discussed results with patient, verbalized understanding.  Discussed reasons to return to the ER, patient verbalized understanding.  Agreeable with plan of care.  Patient was stable upon discharge.    EMR Dragon transcription disclaimer:  Some of this encounter note is an electronic transcription translation of spoken language to printed text. The electronic translation of spoken language may permit erroneous, or at times, nonsensical words or phrases to be inadvertently transcribed; Although I have reviewed the note for such errors some may still exist.           Patient Progress  Patient progress: stable      Final diagnoses:   Lumbar pain            Magdalena Mason NP  11/03/20 0146

## 2020-12-19 ENCOUNTER — HOSPITAL ENCOUNTER (EMERGENCY)
Facility: HOSPITAL | Age: 25
Discharge: HOME | End: 2020-12-19
Admitting: EMERGENCY MEDICINE

## 2020-12-19 VITALS
SYSTOLIC BLOOD PRESSURE: 132 MMHG | RESPIRATION RATE: 18 BRPM | HEIGHT: 71 IN | HEART RATE: 80 BPM | TEMPERATURE: 98 F | DIASTOLIC BLOOD PRESSURE: 84 MMHG | BODY MASS INDEX: 27.59 KG/M2 | OXYGEN SATURATION: 100 % | WEIGHT: 197.09 LBS

## 2020-12-19 DIAGNOSIS — R20.2 PARESTHESIA OF FINGER: ICD-10-CM

## 2020-12-19 DIAGNOSIS — S60.410A ABRASION OF RIGHT INDEX FINGER, INITIAL ENCOUNTER: Primary | ICD-10-CM

## 2020-12-19 PROCEDURE — 25010000002 TDAP 5-2.5-18.5 LF-MCG/0.5 SUSPENSION: Performed by: NURSE PRACTITIONER

## 2020-12-19 PROCEDURE — 99283 EMERGENCY DEPT VISIT LOW MDM: CPT

## 2020-12-19 PROCEDURE — 90715 TDAP VACCINE 7 YRS/> IM: CPT | Performed by: NURSE PRACTITIONER

## 2020-12-19 PROCEDURE — 90471 IMMUNIZATION ADMIN: CPT | Performed by: NURSE PRACTITIONER

## 2020-12-19 RX ORDER — CEPHALEXIN 500 MG/1
500 CAPSULE ORAL 4 TIMES DAILY
Qty: 28 CAPSULE | Refills: 0 | Status: SHIPPED | OUTPATIENT
Start: 2020-12-19 | End: 2020-12-26

## 2020-12-19 RX ADMIN — TETANUS TOXOID, REDUCED DIPHTHERIA TOXOID AND ACELLULAR PERTUSSIS VACCINE, ADSORBED 0.5 ML: 5; 2.5; 8; 8; 2.5 SUSPENSION INTRAMUSCULAR at 23:05

## 2020-12-20 NOTE — ED PROVIDER NOTES
"Subjective   25-year-old male presents with a complaint of wound to the distal right dorsal aspect of the second digit status post \"I pinched it well I was changing breaks at work on Thursday\" he reports erythema denies any drainage.  Denies fever sweats chills.  Reports that the swelling makes it difficult for him to bend his finger.  Reports paresthesia, however sensation intact.    1. Location: dorsum of distal right 2nd digit  2. Quality: burning  3. Severity: mild  4. Worsening factors: Palpation, movement  5. Alleviating factors: Rest  6. Onset: Thursday  7. Radiation: Denies  8. Frequency: Intermittent  9. Co-morbidities: Past Medical History:  No date: ADHD (attention deficit hyperactivity disorder)  No date: History of heart murmur in childhood      Comment:  due to \"hole in heart size of a quarter\"  10. Source: Patient            Review of Systems   Musculoskeletal: Positive for arthralgias. Negative for gait problem and myalgias.   Skin: Positive for wound. Negative for color change, pallor and rash.   Neurological: Negative for weakness and numbness.   Hematological: Does not bruise/bleed easily.   All other systems reviewed and are negative.      Past Medical History:   Diagnosis Date   • ADHD (attention deficit hyperactivity disorder)    • History of heart murmur in childhood     due to \"hole in heart size of a quarter\"       No Known Allergies    Past Surgical History:   Procedure Laterality Date   • CARDIAC SURGERY  2004    Surgery to repair hole in heart with a patch.   • ORIF FINGER FRACTURE Right 2/1/2020    Procedure: PERCUTANEOUS PINNING RT 5TH METACARPAL WITH C-ARM;  Surgeon: Chadwick Tapia MD;  Location: [facility] MAIN OR;  Service: Orthopedics       History reviewed. No pertinent family history.    Social History     Socioeconomic History   • Marital status: Single     Spouse name: Not on file   • Number of children: Not on file   • Years of education: Not on file   • Highest education level: " Not on file   Tobacco Use   • Smoking status: Current Every Day Smoker     Packs/day: 0.25     Types: Cigarettes     Start date: 2/1/2014   • Smokeless tobacco: Former User   Substance and Sexual Activity   • Alcohol use: Not Currently   • Drug use: Never   • Sexual activity: Defer           Objective   Physical Exam  Vitals signs and nursing note reviewed.   Constitutional:       General: He is not in acute distress.     Appearance: Normal appearance. He is well-developed. He is obese.   Cardiovascular:      Pulses: Normal pulses.           Radial pulses are 2+ on the right side and 2+ on the left side.   Musculoskeletal: Normal range of motion.         General: Tenderness present. No deformity.      Right hand: He exhibits tenderness. He exhibits normal range of motion, no bony tenderness, normal two-point discrimination, normal capillary refill, no deformity, no laceration and no swelling. Normal sensation noted. Normal strength noted.        Hands:       Comments: Small, healing abrasion noted with surrounding erythema.  No active drainage.  No mallet deformity noted.  Radial pulses strong and equal bilaterally. Cap refill less than 2 seconds.  Reports paresthesia, however sensation is intact with two-point discrimination test.   Skin:     General: Skin is warm and dry.      Capillary Refill: Capillary refill takes less than 2 seconds.      Coloration: Skin is not pale.   Neurological:      Mental Status: He is alert and oriented to person, place, and time.      Sensory: No sensory deficit.      Motor: No abnormal muscle tone.   Psychiatric:         Mood and Affect: Mood normal.         Behavior: Behavior normal.         Thought Content: Thought content normal.         Judgment: Judgment normal.         Procedures           ED Course      No radiology results for the last day  Medications   Tdap (BOOSTRIX) injection 0.5 mL (has no administration in time range)     Labs Reviewed - No data to display                "                        MDM  Number of Diagnoses or Management Options  Diagnosis management comments: Chart Review: 11/3/2020 patient was seen at this facility for back pain.  Comorbidity: Past Medical History:  No date: ADHD (attention deficit hyperactivity disorder)  No date: History of heart murmur in childhood      Comment:  due to \"hole in heart size of a quarter\"    Patient undressed and placed in gown for exam.  Appropriate PPE worn during patient exam. 25-year-old male presents with a complaint of wound to the distal right dorsal aspect of the second digit status post \"I pinched it well I was changing breaks at work on Thursday\" he reports erythema denies any drainage.  Denies fever sweats chills.  Reports that the swelling makes it difficult for him to bend his finger.  Sensation intact.Small, healing abrasion noted with surrounding erythema.  No active drainage.  No mallet deformity noted.  Radial pulses strong and equal bilaterally.  Sensation intact.  Cap refill less than 2 seconds. Patient's Tdap was updated at this time.  Wound was cleansed and bacitracin applied.  Patient was given a prescription for Keflex.  Instructed to cleanse mostly with antibacterial soap and water and apply bacitracin.  Okay to be open to air unless in dirty environment.  He was given follow-up with hand surgeon.    Disposition/Treatment: Discussed results with patient, verbalized understanding.  Discussed reasons to return to the ER, patient verbalized understanding.  Agreeable with plan of care.  Patient was stable upon discharge.    EMR Dragon transcription disclaimer:  Some of this encounter note is an electronic transcription translation of spoken language to printed text. The electronic translation of spoken language may permit erroneous, or at times, nonsensical words or phrases to be inadvertently transcribed; Although I have reviewed the note for such errors some may still exist.           Patient Progress  Patient " progress: stable      Final diagnoses:   Abrasion of right index finger, initial encounter   Paresthesia of finger            Magdalena Mason, NP  12/19/20 2410

## 2020-12-20 NOTE — DISCHARGE INSTRUCTIONS
Take antibiotics as prescribed with food.  Cleanse twice daily with antibacterial soap and water, then apply bacitracin.  Okay to be open to air unless in dirty environment apply bandage.  It is important to establish primary care provider.  Call Dr. Goins's office to schedule follow-up for wound check.  Schedule follow-up with hand surgeon for numbness.  Return to the ER for new or worsening symptoms.

## 2021-04-19 ENCOUNTER — HOSPITAL ENCOUNTER (EMERGENCY)
Facility: HOSPITAL | Age: 26
Discharge: HOME | End: 2021-04-19
Admitting: EMERGENCY MEDICINE

## 2021-04-19 VITALS
BODY MASS INDEX: 30.28 KG/M2 | SYSTOLIC BLOOD PRESSURE: 128 MMHG | HEIGHT: 71 IN | OXYGEN SATURATION: 97 % | DIASTOLIC BLOOD PRESSURE: 66 MMHG | HEART RATE: 100 BPM | WEIGHT: 216.27 LBS | RESPIRATION RATE: 18 BRPM | TEMPERATURE: 98.7 F

## 2021-04-19 DIAGNOSIS — Z03.818 ENCNTR FOR OBS FOR SUSP EXPSR TO OTH BIOLG AGENTS RULED OUT: Primary | ICD-10-CM

## 2021-04-19 LAB — SARS-COV-2 RNA PNL SPEC NAA+PROBE: NORMAL

## 2021-04-19 PROCEDURE — 99283 EMERGENCY DEPT VISIT LOW MDM: CPT

## 2021-04-19 PROCEDURE — 87635 SARS-COV-2 COVID-19 AMP PRB: CPT | Performed by: NURSE PRACTITIONER

## 2021-04-19 PROCEDURE — C9803 HOPD COVID-19 SPEC COLLECT: HCPCS

## 2021-04-19 NOTE — ED PROVIDER NOTES
"Subjective   Patient is a 25-year-old male presents emergency department requesting a Covid test.  Patient [place].  Patient has no other complaints at this time.  No chest pain shortness of breath.  No fever or chills.  No normal leg pain or swelling.  No nausea, vomiting or diarrhea.          Review of Systems   Constitutional: Negative for chills and fever.   HENT: Negative for congestion and rhinorrhea.    Respiratory: Negative for cough and shortness of breath.    Cardiovascular: Negative for chest pain.   Gastrointestinal: Negative for abdominal pain, diarrhea, nausea and vomiting.   Musculoskeletal: Negative for back pain and neck pain.   Skin: Negative for rash.   Neurological: Negative for headaches.       Past Medical History:   Diagnosis Date   • ADHD (attention deficit hyperactivity disorder)    • History of heart murmur in childhood     due to \"hole in heart size of a quarter\"       No Known Allergies    Past Surgical History:   Procedure Laterality Date   • CARDIAC SURGERY  2004    Surgery to repair hole in heart with a patch.   • ORIF FINGER FRACTURE Right 2/1/2020    Procedure: PERCUTANEOUS PINNING RT 5TH METACARPAL WITH C-ARM;  Surgeon: Chadwick Tapia MD;  Location: [facility] MAIN OR;  Service: Orthopedics       No family history on file.    Social History     Socioeconomic History   • Marital status: Single     Spouse name: Not on file   • Number of children: Not on file   • Years of education: Not on file   • Highest education level: Not on file   Tobacco Use   • Smoking status: Current Every Day Smoker     Packs/day: 0.25     Types: Cigarettes     Start date: 2/1/2014   • Smokeless tobacco: Former User   Substance and Sexual Activity   • Alcohol use: Not Currently   • Drug use: Never   • Sexual activity: Defer           Objective   Physical Exam  Vitals and nursing note reviewed.   Constitutional:       General: He is not in acute distress.     " "Appearance: Normal appearance. He is normal weight. He is not ill-appearing, toxic-appearing or diaphoretic.   HENT:      Head: Normocephalic and atraumatic.      Nose: Nose normal.      Mouth/Throat:      Mouth: Mucous membranes are moist.      Pharynx: Oropharynx is clear.   Eyes:      Extraocular Movements: Extraocular movements intact.      Conjunctiva/sclera: Conjunctivae normal.      Pupils: Pupils are equal, round, and reactive to light.   Cardiovascular:      Rate and Rhythm: Normal rate and regular rhythm.      Heart sounds: Normal heart sounds. No murmur heard.   No friction rub. No gallop.    Pulmonary:      Breath sounds: Normal breath sounds.   Abdominal:      General: Bowel sounds are normal.      Palpations: Abdomen is soft.   Musculoskeletal:         General: Normal range of motion.      Cervical back: Normal range of motion and neck supple.   Skin:     General: Skin is warm and dry.      Capillary Refill: Capillary refill takes less than 2 seconds.   Neurological:      Mental Status: He is alert and oriented to person, place, and time.   Psychiatric:         Mood and Affect: Mood normal.         Behavior: Behavior normal.         Procedures           ED Course  /66 (BP Location: Left arm, Patient Position: Sitting)   Pulse 100   Temp 98.7 °F (37.1 °C) (Oral)   Resp 18   Ht 180.3 cm (71\")   Wt 98.1 kg (216 lb 4.3 oz)   SpO2 97%   BMI 30.16 kg/m²   Labs Reviewed   COVID-19,ABBOTT IN-HOUSE,NASAL SWAB (NO TRANSPORT MEDIA) 2 HR TAT - Normal    Narrative:     Fact sheet for providers: https://www.fda.gov/media/117551/download     Fact sheet for patients: https://www.fda.gov/media/645428/download    Test performed by PCR.  If inconsistent with clinical signs and symptoms patient should be tested with different authorized molecular test.     Medications - No data to display  No radiology results for the last day        Appropriate PPE was worn during the duration of the care for this patient " while in the emergency department per [facility] guidelines                                     MDM  Number of Diagnoses or Management Options  Encntr for obs for susp expsr to oth biolg agents ruled out  Diagnosis management comments: Patient is a 25-year-old male presents emergency department requesting COVID-19 test.  He was exposed to Covid on [place].  Patient's rapid Covid test in the ED is negative.    I spoke with the patient at the bedside regarding their plan of care, discharge instruction, home care, prescriptions, and importance follow-up.  We discussed test results at the bedside.  Patient was made aware of indications to return to the emergency department.  Patient agrees with the current plan of care for discharge, verbalized understanding of all instructions    Pt is aware that discharge does not mean that nothing is wrong but it indicates no emergency is present and they must continue care with follow-up as given below or physician of their choice       Amount and/or Complexity of Data Reviewed  Clinical lab tests: reviewed        Final diagnoses:   Encntr for obs for susp expsr to oth biolg agents ruled out       ED Disposition  ED Disposition     ED Disposition Condition Comment    Discharge Stable           Bianca Clark, APRN  [address]  [facility], [place] [address]  [phone]    Schedule an appointment as soon as possible for a visit       [facility] EMERGENCY DEPARTMENT  [facility] [facility] [address] [phone]    As needed, If symptoms worsen         Medication List      No changes were made to your prescriptions during this visit.          Amy Timmons, APRN  04/19/21 2200

## 2021-04-19 NOTE — ED NOTES
Pt was exposed to COVID on [facility] and has not had symptoms but his children have had a fever.      Holly Eng, RN  04/19/21 6791

## 2021-04-20 NOTE — DISCHARGE INSTRUCTIONS
PER THE CDC GUIDELINES:   You can be with others after  At least 10 days since symptoms first appeared and  At least 24 hours with no fever without fever-reducing medication and  Other symptoms of COVID-19 are improving**Loss of taste and smell may persist for weeks or months after recovery and need not delay the end of isolation?    Return to the ED for new or worsening symptoms    Please follow-up with your primary care provider, call tomorrow for an appointment    Drink plenty of fluids

## 2021-07-02 ENCOUNTER — OFFICE VISIT (OUTPATIENT)
Dept: FAMILY MEDICINE CLINIC | Facility: CLINIC | Age: 26
End: 2021-07-02

## 2021-07-02 VITALS
DIASTOLIC BLOOD PRESSURE: 78 MMHG | OXYGEN SATURATION: 96 % | WEIGHT: 197.8 LBS | SYSTOLIC BLOOD PRESSURE: 122 MMHG | HEART RATE: 71 BPM | BODY MASS INDEX: 27.69 KG/M2 | HEIGHT: 71 IN

## 2021-07-02 DIAGNOSIS — F31.12 BIPOLAR AFFECTIVE DISORDER, CURRENTLY MANIC, MODERATE (HCC): ICD-10-CM

## 2021-07-02 DIAGNOSIS — Z72.0 TOBACCO USE: Primary | ICD-10-CM

## 2021-07-02 DIAGNOSIS — Z00.00 PREVENTATIVE HEALTH CARE: ICD-10-CM

## 2021-07-02 PROBLEM — S62.329A CLOSED FRACTURE OF SHAFT OF METACARPAL BONE: Status: ACTIVE | Noted: 2021-07-02

## 2021-07-02 PROCEDURE — 80050 GENERAL HEALTH PANEL: CPT | Performed by: NURSE PRACTITIONER

## 2021-07-02 PROCEDURE — 99203 OFFICE O/P NEW LOW 30 MIN: CPT | Performed by: NURSE PRACTITIONER

## 2021-07-02 RX ORDER — ARIPIPRAZOLE 2 MG/1
2 TABLET ORAL DAILY
Qty: 7 TABLET | Refills: 0 | Status: SHIPPED | OUTPATIENT
Start: 2021-07-02 | End: 2021-07-29 | Stop reason: SDUPTHER

## 2021-07-02 RX ORDER — ARIPIPRAZOLE 5 MG/1
5 TABLET ORAL DAILY
Qty: 30 TABLET | Refills: 1 | Status: SHIPPED | OUTPATIENT
Start: 2021-07-02 | End: 2021-07-29

## 2021-07-02 NOTE — PROGRESS NOTES
"Chief Complaint  Establish Care, Annual Exam, and Manic Behavior (would like to discuss medication)    Subjective          Herb Smalls presents to [facility]  History of Present Illness    Patient here to establish care with provider.    Patient has history of bipolar disorder. He was admitted to [facility] approximately six years ago.  Medication stopped due to medication coverage.  Patient also reports history of ADHD, previously taking Adderall. Patient c/o mood swings, agitation, physical violence. He denies depression or anxiety. Patient denies suicidal or homicidal thoughts.  He admits to marijuana use, as that provides a calming effect.  Patient denies visual or auditory hallucinations.     PHQ-9 Depression Screening  Little interest or pleasure in doing things? 0   Feeling down, depressed, or hopeless? 0   Trouble falling or staying asleep, or sleeping too much? 0   Feeling tired or having little energy? 0   Poor appetite or overeating? 0   Feeling bad about yourself - or that you are a failure or have let yourself or your family down? 0   Trouble concentrating on things, such as reading the newspaper or watching television? 0   Moving or speaking so slowly that other people could have noticed? Or the opposite - being so fidgety or restless that you have been moving around a lot more than usual? 0   Thoughts that you would be better off dead, or of hurting yourself in some way? 0   PHQ-9 Total Score 0   If you checked off any problems, how difficult have these problems made it for you to do your work, take care of things at home, or get along with other people? Not difficult at all     Objective   Vital Signs:   /78 (BP Location: Left arm, Patient Position: Sitting, Cuff Size: Adult)   Pulse 71   Ht 180.3 cm (71\")   Wt 89.7 kg (197 lb 12.8 oz)   SpO2 96%   BMI 27.59 kg/m²       Physical Exam  Constitutional:       Appearance: Normal appearance.   HENT:      Head: " Normocephalic.   Cardiovascular:      Rate and Rhythm: Normal rate and regular rhythm.   Pulmonary:      Effort: Pulmonary effort is normal.      Breath sounds: Normal breath sounds.   Abdominal:      General: Abdomen is flat. Bowel sounds are normal.      Palpations: Abdomen is soft.   Musculoskeletal:         General: Normal range of motion.      Cervical back: Neck supple.      Right lower leg: No edema.      Left lower leg: No edema.   Skin:     General: Skin is warm and dry.   Neurological:      Mental Status: He is alert and oriented to person, place, and time.      Gait: Gait is intact.   Psychiatric:         Attention and Perception: Attention normal.         Mood and Affect: Mood normal.         Speech: Speech normal.        Result Review :                 Assessment and Plan    Diagnoses and all orders for this visit:    1. Tobacco use (Primary)    2. Bipolar affective disorder, currently manic, moderate (CMS/[facility])  Assessment & Plan:  1.  Start Abilify 2 mg daily x7 days then increase to 5 mg daily  2.  Follow-up in 4 weeks  3.  Call with any concerns or adverse effects  4.  Depending on response to medication, discussed consultation with psychiatry      3. Preventative health care  Assessment & Plan:  1.  Check labs    Orders:  -     CBC (No Diff)  -     Comprehensive Metabolic Panel  -     TSH    Other orders  -     ARIPiprazole (Abilify) 2 MG tablet; Take 1 tablet by mouth Daily.  Dispense: 7 tablet; Refill: 0  -     ARIPiprazole (Abilify) 5 MG tablet; Take 1 tablet by mouth Daily.  Dispense: 30 tablet; Refill: 1    I spent 25 minutes caring for Herb on this date of service. This time includes time spent by me in the following activities:preparing for the visit, obtaining and/or reviewing a separately obtained history, performing a medically appropriate examination and/or evaluation , counseling and educating the patient/family/caregiver, ordering medications, tests, or procedures, documenting  information in the medical record and care coordination  Follow Up   Return in about 4 weeks (around 7/30/2021).  Patient was given instructions and counseling regarding his condition or for health maintenance advice. Please see specific information pulled into the AVS if appropriate.

## 2021-07-02 NOTE — ASSESSMENT & PLAN NOTE
1.  Start Abilify 2 mg daily x7 days then increase to 5 mg daily  2.  Follow-up in 4 weeks  3.  Call with any concerns or adverse effects  4.  Depending on response to medication, discussed consultation with psychiatry

## 2021-07-02 NOTE — PATIENT INSTRUCTIONS
Steps to Quit Smoking  Smoking tobacco is the leading cause of preventable death. It can affect almost every organ in the body. Smoking puts you and those around you at risk for developing many serious chronic diseases. Quitting smoking can be difficult, but it is one of the best things that you can do for your health. It is never too late to quit.  How do I get ready to quit?  When you decide to quit smoking, create a plan to help you succeed. Before you quit:  · Pick a date to quit. Set a date within the next 2 weeks to give you time to prepare.  · Write down the reasons why you are quitting. Keep this list in places where you will see it often.  · Tell your family, friends, and co-workers that you are quitting. Support from your loved ones can make quitting easier.  · Talk with your health care provider about your options for quitting smoking.  · Find out what treatment options are covered by your health insurance.  · Identify people, places, things, and activities that make you want to smoke (triggers). Avoid them.  What first steps can I take to quit smoking?  · Throw away all cigarettes at home, at work, and in your car.  · Throw away smoking accessories, such as ashtrays and lighters.  · Clean your car. Make sure to empty the ashtray.  · Clean your home, including curtains and carpets.  What strategies can I use to quit smoking?  Talk with your health care provider about combining strategies, such as taking medicines while you are also receiving in-person counseling. Using these two strategies together makes you more likely to succeed in quitting than if you used either strategy on its own.  · If you are pregnant or breastfeeding, talk with your health care provider about finding counseling or other support strategies to quit smoking. Do not take medicine to help you quit smoking unless your health care provider tells you to do so.  To quit smoking:  Quit right away  · Quit smoking completely, instead of  gradually reducing how much you smoke over a period of time. Research shows that stopping smoking right away is more successful than gradually quitting.  · Attend in-person counseling to help you build problem-solving skills. You are more likely to succeed in quitting if you attend counseling sessions regularly. Even short sessions of 10 minutes can be effective.  Take medicine  You may take medicines to help you quit smoking. Some medicines require a prescription and some you can purchase over-the-counter. Medicines may have nicotine in them to replace the nicotine in cigarettes. Medicines may:  · Help to stop cravings.  · Help to relieve withdrawal symptoms.  Your health care provider may recommend:  · Nicotine patches, gum, or lozenges.  · Nicotine inhalers or sprays.  · Non-nicotine medicine that is taken by mouth.  Find resources  Find resources and support systems that can help you to quit smoking and remain smoke-free after you quit. These resources are most helpful when you use them often. They include:  · Online chats with a counselor.  · Telephone quitlines.  · Printed self-help materials.  · Support groups or group counseling.  · Text messaging programs.  · Mobile phone apps or applications. Use apps that can help you stick to your quit plan by providing reminders, tips, and encouragement. There are many free apps for mobile devices as well as websites. Examples include Quit Guide from the CDC and smokefree.gov  What things can I do to make it easier to quit?    · Reach out to your family and friends for support and encouragement. Call telephone quitlines (9-226-QUIT-NOW), reach out to support groups, or work with a counselor for support.  · Ask people who smoke to avoid smoking around you.  · Avoid places that trigger you to smoke, such as bars, parties, or smoke-break areas at work.  · Spend time with people who do not smoke.  · Lessen the stress in your life. Stress can be a smoking trigger for some  people. To lessen stress, try:  ? Exercising regularly.  ? Doing deep-breathing exercises.  ? Doing yoga.  ? Meditating.  ? Performing a body scan. This involves closing your eyes, scanning your body from head to toe, and noticing which parts of your body are particularly tense. Try to relax the muscles in those areas.  How will I feel when I quit smoking?  Day 1 to 3 weeks  Within the first 24 hours of quitting smoking, you may start to feel withdrawal symptoms. These symptoms are usually most noticeable 2-3 days after quitting, but they usually do not last for more than 2-3 weeks. You may experience these symptoms:  · Mood swings.  · Restlessness, anxiety, or irritability.  · Trouble concentrating.  · Dizziness.  · Strong cravings for sugary foods and nicotine.  · Mild weight gain.  · Constipation.  · Nausea.  · Coughing or a sore throat.  · Changes in how the medicines that you take for unrelated issues work in your body.  · Depression.  · Trouble sleeping (insomnia).  Week 3 and afterward  After the first 2-3 weeks of quitting, you may start to notice more positive results, such as:  · Improved sense of smell and taste.  · Decreased coughing and sore throat.  · Slower heart rate.  · Lower blood pressure.  · Clearer skin.  · The ability to breathe more easily.  · Fewer sick days.  Quitting smoking can be very challenging. Do not get discouraged if you are not successful the first time. Some people need to make many attempts to quit before they achieve long-term success. Do your best to stick to your quit plan, and talk with your health care provider if you have any questions or concerns.  Summary  · Smoking tobacco is the leading cause of preventable death. Quitting smoking is one of the best things that you can do for your health.  · When you decide to quit smoking, create a plan to help you succeed.  · Quit smoking right away, not slowly over a period of time.  · When you start quitting, seek help from your  health care provider, family, or friends.  This information is not intended to replace advice given to you by your health care provider. Make sure you discuss any questions you have with your health care provider.  Document Revised: 09/11/2020 Document Reviewed: 03/07/2020  Elsevier Patient Education © 2021 [employer]

## 2021-07-03 LAB
ALBUMIN SERPL-MCNC: 4.8 G/DL (ref 3.5–5.2)
ALBUMIN/GLOB SERPL: 1.8 G/DL
ALP SERPL-CCNC: 86 U/L (ref 39–117)
ALT SERPL W P-5'-P-CCNC: 23 U/L (ref 1–41)
ANION GAP SERPL CALCULATED.3IONS-SCNC: 10.5 MMOL/L (ref 5–15)
AST SERPL-CCNC: 25 U/L (ref 1–40)
BILIRUB SERPL-MCNC: 0.8 MG/DL (ref 0–1.2)
BUN SERPL-MCNC: 12 MG/DL (ref 6–20)
BUN/CREAT SERPL: 19.4 (ref 7–25)
CALCIUM SPEC-SCNC: 10 MG/DL (ref 8.6–10.5)
CHLORIDE SERPL-SCNC: 103 MMOL/L (ref 98–107)
CO2 SERPL-SCNC: 26.5 MMOL/L (ref 22–29)
CREAT SERPL-MCNC: 0.62 MG/DL (ref 0.76–1.27)
DEPRECATED RDW RBC AUTO: 41.4 FL (ref 37–54)
ERYTHROCYTE [DISTWIDTH] IN BLOOD BY AUTOMATED COUNT: 13.1 % (ref 12.3–15.4)
GFR SERPL CREATININE-BSD FRML MDRD: >150 ML/MIN/1.73
GLOBULIN UR ELPH-MCNC: 2.7 GM/DL
GLUCOSE SERPL-MCNC: 84 MG/DL (ref 65–99)
HCT VFR BLD AUTO: 48.5 % (ref 37.5–51)
HGB BLD-MCNC: 16.2 G/DL (ref 13–17.7)
MCH RBC QN AUTO: 29.4 PG (ref 26.6–33)
MCHC RBC AUTO-ENTMCNC: 33.4 G/DL (ref 31.5–35.7)
MCV RBC AUTO: 88 FL (ref 79–97)
PLATELET # BLD AUTO: 249 10*3/MM3 (ref 140–450)
PMV BLD AUTO: 11.3 FL (ref 6–12)
POTASSIUM SERPL-SCNC: 4.5 MMOL/L (ref 3.5–5.2)
PROT SERPL-MCNC: 7.5 G/DL (ref 6–8.5)
RBC # BLD AUTO: 5.51 10*6/MM3 (ref 4.14–5.8)
SODIUM SERPL-SCNC: 140 MMOL/L (ref 136–145)
TSH SERPL DL<=0.05 MIU/L-ACNC: 2.05 UIU/ML (ref 0.27–4.2)
WBC # BLD AUTO: 8.07 10*3/MM3 (ref 3.4–10.8)

## 2021-07-29 RX ORDER — ARIPIPRAZOLE 2 MG/1
2 TABLET ORAL DAILY
Qty: 30 TABLET | Refills: 0 | Status: SHIPPED | OUTPATIENT
Start: 2021-07-29 | End: 2021-08-23

## 2021-08-23 RX ORDER — ARIPIPRAZOLE 2 MG/1
TABLET ORAL
Qty: 30 TABLET | Refills: 0 | Status: SHIPPED | OUTPATIENT
Start: 2021-08-23 | End: 2022-05-27

## 2021-10-22 RX ORDER — AMOXICILLIN 500 MG/1
1000 CAPSULE ORAL 2 TIMES DAILY
Qty: 20 CAPSULE | Refills: 0 | Status: SHIPPED | OUTPATIENT
Start: 2021-10-22 | End: 2022-02-07

## 2021-10-28 ENCOUNTER — TELEMEDICINE (OUTPATIENT)
Dept: FAMILY MEDICINE CLINIC | Facility: CLINIC | Age: 26
End: 2021-10-28

## 2021-10-28 DIAGNOSIS — F31.32 BIPOLAR AFFECTIVE DISORDER, CURRENTLY DEPRESSED, MODERATE (HCC): Primary | ICD-10-CM

## 2021-10-28 PROCEDURE — 99213 OFFICE O/P EST LOW 20 MIN: CPT | Performed by: NURSE PRACTITIONER

## 2021-10-28 RX ORDER — LAMOTRIGINE 25 MG/1
25 TABLET ORAL DAILY
Qty: 30 TABLET | Refills: 0 | Status: SHIPPED | OUTPATIENT
Start: 2021-10-28 | End: 2021-11-29

## 2021-11-01 PROBLEM — F31.32 BIPOLAR AFFECTIVE DISORDER, CURRENTLY DEPRESSED, MODERATE: Status: ACTIVE | Noted: 2021-07-02

## 2021-11-01 NOTE — ASSESSMENT & PLAN NOTE
1. D/C Abilify  2. Start Lamictal 25mg daily  3. Follow up in 4 weeks  4. Call with any new or worsening concerns

## 2021-11-29 RX ORDER — LAMOTRIGINE 25 MG/1
TABLET ORAL
Qty: 30 TABLET | Refills: 0 | Status: SHIPPED | OUTPATIENT
Start: 2021-11-29

## 2021-11-29 NOTE — TELEPHONE ENCOUNTER
Rx Refill Note  Requested Prescriptions     Pending Prescriptions Disp Refills   • lamoTRIgine (LaMICtal) 25 MG tablet [Pharmacy Med Name: LAMOTRIGINE 25 MG TABLET] 30 tablet 0     Sig: TAKE 1 TABLET BY MOUTH EVERY DAY      Last office visit with prescribing clinician: 7/2/2021      Next office visit with prescribing clinician: 11/29/2021            Brandy Rodriguez MA  11/29/21, 08:21 EST

## 2022-02-07 RX ORDER — AMOXICILLIN AND CLAVULANATE POTASSIUM 875; 125 MG/1; MG/1
1 TABLET, FILM COATED ORAL 2 TIMES DAILY
Qty: 20 TABLET | Refills: 0 | Status: SHIPPED | OUTPATIENT
Start: 2022-02-07 | End: 2022-05-27

## 2022-05-27 ENCOUNTER — OFFICE VISIT (OUTPATIENT)
Dept: FAMILY MEDICINE CLINIC | Facility: CLINIC | Age: 27
End: 2022-05-27

## 2022-05-27 ENCOUNTER — TELEPHONE (OUTPATIENT)
Dept: FAMILY MEDICINE CLINIC | Facility: CLINIC | Age: 27
End: 2022-05-27

## 2022-05-27 VITALS
BODY MASS INDEX: 26.88 KG/M2 | SYSTOLIC BLOOD PRESSURE: 124 MMHG | OXYGEN SATURATION: 99 % | HEART RATE: 84 BPM | WEIGHT: 192 LBS | DIASTOLIC BLOOD PRESSURE: 88 MMHG | HEIGHT: 71 IN

## 2022-05-27 DIAGNOSIS — R11.2 NAUSEA AND VOMITING, UNSPECIFIED VOMITING TYPE: ICD-10-CM

## 2022-05-27 DIAGNOSIS — R10.12 LUQ ABDOMINAL PAIN: Primary | ICD-10-CM

## 2022-05-27 LAB
BILIRUB BLD-MCNC: NEGATIVE MG/DL
CLARITY, POC: CLEAR
COLOR UR: NORMAL
GLUCOSE UR STRIP-MCNC: NEGATIVE MG/DL
KETONES UR QL: NEGATIVE
LEUKOCYTE EST, POC: NEGATIVE
NITRITE UR-MCNC: NEGATIVE MG/ML
PH UR: 6.5 [PH] (ref 5–8)
PROT UR STRIP-MCNC: NEGATIVE MG/DL
RBC # UR STRIP: NEGATIVE /UL
SP GR UR: 1.01 (ref 1–1.03)
UROBILINOGEN UR QL: NORMAL

## 2022-05-27 PROCEDURE — 85652 RBC SED RATE AUTOMATED: CPT | Performed by: NURSE PRACTITIONER

## 2022-05-27 PROCEDURE — 85025 COMPLETE CBC W/AUTO DIFF WBC: CPT | Performed by: NURSE PRACTITIONER

## 2022-05-27 PROCEDURE — 80053 COMPREHEN METABOLIC PANEL: CPT | Performed by: NURSE PRACTITIONER

## 2022-05-27 PROCEDURE — 99214 OFFICE O/P EST MOD 30 MIN: CPT | Performed by: NURSE PRACTITIONER

## 2022-05-27 PROCEDURE — 86677 HELICOBACTER PYLORI ANTIBODY: CPT | Performed by: NURSE PRACTITIONER

## 2022-05-27 RX ORDER — OMEPRAZOLE 40 MG/1
40 CAPSULE, DELAYED RELEASE ORAL DAILY
Qty: 90 CAPSULE | Refills: 1 | Status: SHIPPED | OUTPATIENT
Start: 2022-05-27

## 2022-05-27 NOTE — ASSESSMENT & PLAN NOTE
1.  Check UA  2.  Check labs including H. Pylori  3.  If negative, consider imaging versus GI referral

## 2022-05-27 NOTE — PROGRESS NOTES
"Chief Complaint  Abdominal Pain (In the morning having severe abdominal pain through the middle of his stomach) and Vomiting (First thing in the morning )    Subjective          Herb Smalls presents to [facility]  History of Present Illness    Patient presents with midline abdominal pain that has been occurring in the morning. He reports he wakes up with pain and nausea, will vomit and then pain subsides. Patient also intermittently vomits after large meals. Patient denies any fever, diarrhea, constipation, blood/mucus in stool or vomit, or urinary symptoms. Symptoms have been present for approximately one month.     Objective   Vital Signs:  /88 (BP Location: Left arm, Patient Position: Sitting, Cuff Size: Adult)   Pulse 84   Ht 180.3 cm (71\")   Wt 87.1 kg (192 lb)   SpO2 99%   BMI 26.78 kg/m²         Physical Exam  Constitutional:       Appearance: Normal appearance.   HENT:      Head: Normocephalic.   Cardiovascular:      Rate and Rhythm: Normal rate and regular rhythm.   Pulmonary:      Effort: Pulmonary effort is normal.      Breath sounds: Normal breath sounds.   Abdominal:      General: Abdomen is flat. Bowel sounds are normal.      Palpations: Abdomen is soft.      Tenderness: There is abdominal tenderness in the left upper quadrant.   Musculoskeletal:         General: Normal range of motion.      Cervical back: Neck supple.      Right lower leg: No edema.      Left lower leg: No edema.   Skin:     General: Skin is warm and dry.   Neurological:      Mental Status: He is alert and oriented to person, place, and time.      Gait: Gait is intact.   Psychiatric:         Attention and Perception: Attention normal.         Mood and Affect: Mood normal.         Speech: Speech normal.        Result Review :                 Assessment and Plan    Diagnoses and all orders for this visit:    1. LUQ abdominal pain (Primary)  Assessment & Plan:  1.  Check UA  2.  Check labs " including H. Pylori  3.  If negative, consider imaging versus GI referral    Orders:  -     CBC & Differential  -     Comprehensive Metabolic Panel  -     Sedimentation rate, automated  -     H. Pylori IgM, Blood  -     POC Urinalysis Dipstick    2. Nausea and vomiting, unspecified vomiting type  Assessment & Plan:  1.  Start omeprazole 40 mg daily    Orders:  -     CBC & Differential  -     Comprehensive Metabolic Panel  -     Sedimentation rate, automated  -     H. Pylori IgM, Blood    Other orders  -     omeprazole (priLOSEC) 40 MG capsule; Take 1 capsule by mouth Daily.  Dispense: 90 capsule; Refill: 1         I spent 25 minutes caring for Herb on this date of service. This time includes time spent by me in the following activities:preparing for the visit, reviewing tests, obtaining and/or reviewing a separately obtained history, performing a medically appropriate examination and/or evaluation , counseling and educating the patient/family/caregiver, ordering medications, tests, or procedures, documenting information in the medical record, independently interpreting results and communicating that information with the patient/family/caregiver and care coordination  Follow Up   Return if symptoms worsen or fail to improve.  Patient was given instructions and counseling regarding his condition or for health maintenance advice. Please see specific information pulled into the AVS if appropriate.

## 2022-05-27 NOTE — TELEPHONE ENCOUNTER
Caller: Herb Smalls    Relationship: Self    Best call back number: [phone]     What form or medical record are you requesting: WORK EXCUSE FOR 05/26/22    Who is requesting this form or medical record from you: [employer]    Timeframe paperwork needed: ASAP    Additional notes: PATIENT CALLING WANTING TO KNOW IF THE WORK EXCUSE COULD BE EMAILED TO HIS JOB THEY ARE UNSURE IF THEY CAN RECEIVE FAXES OR COUD IT BE UPLOADED TO [employer] FOR HIM TO BE ABLE TO SEND TO HIS JOB      E-MAIL:[email or web address]

## 2022-05-28 ENCOUNTER — HOSPITAL ENCOUNTER (EMERGENCY)
Facility: HOSPITAL | Age: 27
Discharge: LEFT BEFORE DISCHARGE | End: 2022-05-28

## 2022-05-28 ENCOUNTER — HOSPITAL ENCOUNTER (EMERGENCY)
Facility: HOSPITAL | Age: 27
Discharge: LEFT BEFORE DISCHARGE | End: 2022-05-29

## 2022-05-28 VITALS
OXYGEN SATURATION: 98 % | HEART RATE: 80 BPM | WEIGHT: 191 LBS | DIASTOLIC BLOOD PRESSURE: 81 MMHG | TEMPERATURE: 97.7 F | HEIGHT: 71 IN | BODY MASS INDEX: 26.74 KG/M2 | RESPIRATION RATE: 16 BRPM | SYSTOLIC BLOOD PRESSURE: 131 MMHG

## 2022-05-28 LAB
ALBUMIN SERPL-MCNC: 5.3 G/DL (ref 3.5–5.2)
ALBUMIN/GLOB SERPL: 2.7 G/DL
ALP SERPL-CCNC: 84 U/L (ref 39–117)
ALT SERPL W P-5'-P-CCNC: 19 U/L (ref 1–41)
ANION GAP SERPL CALCULATED.3IONS-SCNC: 10.5 MMOL/L (ref 5–15)
AST SERPL-CCNC: 19 U/L (ref 1–40)
BASOPHILS # BLD AUTO: 0.05 10*3/MM3 (ref 0–0.2)
BASOPHILS NFR BLD AUTO: 0.7 % (ref 0–1.5)
BILIRUB SERPL-MCNC: 0.8 MG/DL (ref 0–1.2)
BUN SERPL-MCNC: 11 MG/DL (ref 6–20)
BUN/CREAT SERPL: 13.6 (ref 7–25)
CALCIUM SPEC-SCNC: 10.2 MG/DL (ref 8.6–10.5)
CHLORIDE SERPL-SCNC: 104 MMOL/L (ref 98–107)
CO2 SERPL-SCNC: 27.5 MMOL/L (ref 22–29)
CREAT SERPL-MCNC: 0.81 MG/DL (ref 0.76–1.27)
DEPRECATED RDW RBC AUTO: 40 FL (ref 37–54)
EGFRCR SERPLBLD CKD-EPI 2021: 126.3 ML/MIN/1.73
EOSINOPHIL # BLD AUTO: 0.17 10*3/MM3 (ref 0–0.4)
EOSINOPHIL NFR BLD AUTO: 2.4 % (ref 0.3–6.2)
ERYTHROCYTE [DISTWIDTH] IN BLOOD BY AUTOMATED COUNT: 12.8 % (ref 12.3–15.4)
ERYTHROCYTE [SEDIMENTATION RATE] IN BLOOD: 8 MM/HR (ref 0–15)
GLOBULIN UR ELPH-MCNC: 2 GM/DL
GLUCOSE SERPL-MCNC: 85 MG/DL (ref 65–99)
HCT VFR BLD AUTO: 46.2 % (ref 37.5–51)
HGB BLD-MCNC: 15.6 G/DL (ref 13–17.7)
IMM GRANULOCYTES # BLD AUTO: 0.01 10*3/MM3 (ref 0–0.05)
IMM GRANULOCYTES NFR BLD AUTO: 0.1 % (ref 0–0.5)
LYMPHOCYTES # BLD AUTO: 1.87 10*3/MM3 (ref 0.7–3.1)
LYMPHOCYTES NFR BLD AUTO: 25.9 % (ref 19.6–45.3)
MCH RBC QN AUTO: 29.3 PG (ref 26.6–33)
MCHC RBC AUTO-ENTMCNC: 33.8 G/DL (ref 31.5–35.7)
MCV RBC AUTO: 86.8 FL (ref 79–97)
MONOCYTES # BLD AUTO: 0.45 10*3/MM3 (ref 0.1–0.9)
MONOCYTES NFR BLD AUTO: 6.2 % (ref 5–12)
NEUTROPHILS NFR BLD AUTO: 4.67 10*3/MM3 (ref 1.7–7)
NEUTROPHILS NFR BLD AUTO: 64.7 % (ref 42.7–76)
NRBC BLD AUTO-RTO: 0 /100 WBC (ref 0–0.2)
PLATELET # BLD AUTO: 266 10*3/MM3 (ref 140–450)
PMV BLD AUTO: 11.1 FL (ref 6–12)
POTASSIUM SERPL-SCNC: 4.7 MMOL/L (ref 3.5–5.2)
PROT SERPL-MCNC: 7.3 G/DL (ref 6–8.5)
RBC # BLD AUTO: 5.32 10*6/MM3 (ref 4.14–5.8)
SODIUM SERPL-SCNC: 142 MMOL/L (ref 136–145)
WBC NRBC COR # BLD: 7.22 10*3/MM3 (ref 3.4–10.8)

## 2022-05-28 PROCEDURE — 99211 OFF/OP EST MAY X REQ PHY/QHP: CPT

## 2022-05-31 ENCOUNTER — TELEPHONE (OUTPATIENT)
Dept: FAMILY MEDICINE CLINIC | Facility: CLINIC | Age: 27
End: 2022-05-31

## 2022-05-31 LAB — H PYLORI IGM SER-ACNC: <9 UNITS (ref 0–8.9)

## 2022-05-31 NOTE — PROGRESS NOTES
H. pylori testing is also negative.  Please see how patient is feeling.  If no improvement with omeprazole, we discussed imaging at visit

## 2022-05-31 NOTE — TELEPHONE ENCOUNTER
----- Message from SAUL Thomas sent at 5/30/2022  8:46 PM EDT -----  Labs normal so far. Waiting for h.pylori - please see how patient is feeling.

## 2022-05-31 NOTE — TELEPHONE ENCOUNTER
Pt. Expressed understanding and stated his stomach pain has subsided for the most part but his pharmacy is requesting a PA and is currently withholding his stomach medication  05/31/22 RCC

## 2022-06-01 ENCOUNTER — TELEPHONE (OUTPATIENT)
Dept: FAMILY MEDICINE CLINIC | Facility: CLINIC | Age: 27
End: 2022-06-01

## 2022-06-01 NOTE — TELEPHONE ENCOUNTER
----- Message from SAUL Thomas sent at 5/31/2022  3:12 PM EDT -----  H. pylori testing is also negative.  Please see how patient is feeling.  If no improvement with omeprazole, we discussed imaging at visit

## 2022-06-01 NOTE — TELEPHONE ENCOUNTER
Caller: Herb Smalls    Relationship: Self    Best call back number:[phone]    What is the best time to reach you: ANY     Who are you requesting to speak with (clinical staff, provider,  specific staff member): CLINICAL   Do you know the name of the person who called: BOB     What was the call regarding: PATIENT WAS RETURNING BOB'S PHONE CALL, UNABLE TO WARM TRANSFER     Do you require a callback: YES

## 2022-06-02 ENCOUNTER — TELEPHONE (OUTPATIENT)
Dept: FAMILY MEDICINE CLINIC | Facility: CLINIC | Age: 27
End: 2022-06-02

## 2022-06-02 NOTE — TELEPHONE ENCOUNTER
Called Pt. Back, he unfortunately has to wait to get the PA approved before he can get the medication because he can't afford the medication OTC. Pt. Stated he would be ok. Called ended on a positive note.  06/02/22 RCC

## 2022-08-26 DIAGNOSIS — M25.551 RIGHT HIP PAIN: Primary | ICD-10-CM

## 2022-09-29 ENCOUNTER — TELEPHONE (OUTPATIENT)
Dept: FAMILY MEDICINE CLINIC | Facility: CLINIC | Age: 27
End: 2022-09-29

## 2022-09-29 NOTE — TELEPHONE ENCOUNTER
Attempted to call pt regarding xray, no answer: per verbal could only leave msg for pt to call office

## 2023-07-03 ENCOUNTER — TELEPHONE (OUTPATIENT)
Dept: FAMILY MEDICINE CLINIC | Facility: CLINIC | Age: 28
End: 2023-07-03

## 2023-07-03 NOTE — TELEPHONE ENCOUNTER
Yes we can. Office is closed tomorrow so he can come in Wednesday or I can send oral steroids instead

## 2023-07-03 NOTE — TELEPHONE ENCOUNTER
Caller: Herb Smalls    Relationship: Self    Best call back number: [phone]     What is the best time to reach you: ANYTIME     Who are you requesting to speak with (clinical staff, provider,  specific staff member): CLINICAL     Do you know the name of the person who called: HERB    What was the call regarding: HERB WOULD LIKE TO KNOW IF AMAN GARCIA  ADMINISTERS SHOTS FOR POISON IVY OR POISON SUMAC. HE HAS ITCHY BLISTERS ON HAND ARMS, STOMACH AREA     Is it okay if the provider responds through MyChart: NO

## (undated) DEVICE — PK PROC TURNOVER

## (undated) DEVICE — SOL IRRIG NACL 9PCT 1000ML BTL

## (undated) DEVICE — CUFF TOURNI 1BLADDER 1PRT 18IN STRL

## (undated) DEVICE — GOWN,REINFORCE,POLY,SIRUS,BREATH SLV,XLG: Brand: MEDLINE

## (undated) DEVICE — SOL IRRIG H2O 1000ML STRL

## (undated) DEVICE — APPL CHLORAPREP W/TINT 10.5ML ORNG

## (undated) DEVICE — GLV SURG TRIUMPH GREEN W/ALOE PF LTX 8 STRL

## (undated) DEVICE — PK EXTREM 50

## (undated) DEVICE — PAD CAST SYN PROTOUCH RL 4IN NS

## (undated) DEVICE — OCCLUSIVE GAUZE STRIP OVERWRAP,3% BISMUTH TRIBROMOPHENATE IN PETROLATUM BLEND: Brand: XEROFORM

## (undated) DEVICE — PAD CAST SPECIST 3IN STRL

## (undated) DEVICE — SPNG GZ AVANT 6PLY 4X4IN STRL PK/2

## (undated) DEVICE — GLV SURG TRIUMPH ORTHO W/ALOE PF LTX 8 STRL

## (undated) DEVICE — SPLNT ORTHOGLASS 4INX15FT

## (undated) DEVICE — BNDG ELAS MATRX V/CLS 2INX5YD LF

## (undated) DEVICE — 3M™ STERI-DRAPE™ U-DRAPE 1015: Brand: STERI-DRAPE™

## (undated) DEVICE — SPLNT ORTHOGLASS 3INX15FT SN

## (undated) DEVICE — BNDG ELAS MATRX V/CLS 3INX5YD LF

## (undated) DEVICE — GRD PIN WHT 0.45

## (undated) DEVICE — BNDG ELAS MATRX V/CLS 4IN 5YD LF

## (undated) DEVICE — 3M™ STERI-STRIP™ REINFORCED ADHESIVE SKIN CLOSURES, R1541, 1/4 IN X 3 IN (6 MM X 75 MM), 3 STRIPS/ENVELOPE: Brand: 3M™ STERI-STRIP™

## (undated) DEVICE — CUSTOM PACK: Brand: UNBRANDED